# Patient Record
Sex: MALE | Race: BLACK OR AFRICAN AMERICAN | NOT HISPANIC OR LATINO | Employment: OTHER | URBAN - METROPOLITAN AREA
[De-identification: names, ages, dates, MRNs, and addresses within clinical notes are randomized per-mention and may not be internally consistent; named-entity substitution may affect disease eponyms.]

---

## 2017-01-13 ENCOUNTER — AMBULATORY - HEALTHEAST (OUTPATIENT)
Dept: FAMILY MEDICINE | Facility: CLINIC | Age: 57
End: 2017-01-13

## 2019-11-18 ENCOUNTER — OFFICE VISIT (OUTPATIENT)
Dept: FAMILY MEDICINE | Facility: CLINIC | Age: 59
End: 2019-11-18
Payer: COMMERCIAL

## 2019-11-18 VITALS
HEART RATE: 68 BPM | DIASTOLIC BLOOD PRESSURE: 68 MMHG | BODY MASS INDEX: 18.96 KG/M2 | HEIGHT: 72 IN | RESPIRATION RATE: 16 BRPM | WEIGHT: 140 LBS | TEMPERATURE: 97.9 F | SYSTOLIC BLOOD PRESSURE: 130 MMHG | OXYGEN SATURATION: 99 %

## 2019-11-18 DIAGNOSIS — J01.90 ACUTE SINUSITIS WITH SYMPTOMS > 10 DAYS: Primary | ICD-10-CM

## 2019-11-18 DIAGNOSIS — R05.9 COUGH: ICD-10-CM

## 2019-11-18 PROCEDURE — 99203 OFFICE O/P NEW LOW 30 MIN: CPT | Performed by: FAMILY MEDICINE

## 2019-11-18 RX ORDER — CODEINE PHOSPHATE AND GUAIFENESIN 10; 100 MG/5ML; MG/5ML
1-2 SOLUTION ORAL
Qty: 118 ML | Refills: 0 | Status: SHIPPED | OUTPATIENT
Start: 2019-11-18

## 2019-11-18 ASSESSMENT — MIFFLIN-ST. JEOR: SCORE: 1502.01

## 2019-11-18 NOTE — PROGRESS NOTES
"Subjective     Jorge L Brower is a 59 year old male who presents to clinic today for the following health issues:    HPI   RESPIRATORY SYMPTOMS      Duration: 2 weeks    Description  nasal congestion, rhinorrhea, sore throat, facial pain/pressure, cough and intermittent headache    Severity: severe    Accompanying signs and symptoms: None    History (predisposing factors):  Very long time ago used to use tobacco. Quit 5 years ago.     Precipitating or alleviating factors: None    Therapies tried and outcome:  rest and fluids acetaminophen OTC NSAID and benadryl; only give temporary minor relief    2 weeks ago with sore throat, congestion, runny nose. Tried Flonase without relief. Has used acetaminophen (doses unknown) and ibuprofen on occasion which has provided some relief. Feels that cough is worsening the last couple days. Endorses \"cough in the throat\" non productive. Cough is continuous once starts to cough. Denies HA, changes in vision, n/v, diarrhea, constipation, dysuria. He has not had the flu shot this year. He works at a  but is not exposed to kids at the .    Of note, he previously was seen at [facility] for medical health but has not been seen for there many years.      There is no problem list on file for this patient.    Past Surgical History:   Procedure Laterality Date     CHOLECYSTECTOMY  2016       Social History     Tobacco Use     Smoking status: Former Smoker     Smokeless tobacco: Never Used     Tobacco comment: quit several years ago, noted 11/2019   Substance Use Topics     Alcohol use: Not on file     Family History   Problem Relation Age of Onset     No Known Problems Mother      No Known Problems Father      No Known Problems Maternal Grandmother      No Known Problems Maternal Grandfather      No Known Problems Paternal Grandmother      No Known Problems Paternal Grandfather          Current Outpatient Medications   Medication Sig Dispense Refill     " "amoxicillin-clavulanate (AUGMENTIN) 875-125 MG tablet Take 1 tablet by mouth 2 times daily for 10 days 20 tablet 0     guaiFENesin-codeine (ROBITUSSIN AC) 100-10 MG/5ML solution Take 5-10 mLs by mouth nightly as needed for cough 118 mL 0     Allergies   Allergen Reactions     Cats      Dogs      Seasonal Allergies        Reviewed and updated as needed this visit by Provider    Review of Systems   ROS COMP: Constitutional, HEENT, cardiovascular, pulmonary, GI, , musculoskeletal, neuro, skin, endocrine and psych systems are negative, except as otherwise noted.      Objective    /68 (BP Location: Right arm, Patient Position: Chair, Cuff Size: Adult Regular)   Pulse 68   Temp 97.9  F (36.6  C) (Oral)   Resp 16   Ht 1.835 m (6' 0.25\")   Wt 63.5 kg (140 lb)   SpO2 99%   BMI 18.86 kg/m    Body mass index is 18.86 kg/m .     Physical Exam   GENERAL: healthy, alert and no distress  HENT: ear canals and TM's normal, nose and mouth without ulcers or lesions  HENT: normal cephalic/atraumatic, ear canals and TM's normal, nose and mouth without ulcers or lesions, oral mucous membranes moist and sinuses: maxillary, frontal tenderness on both sides with percussion - winces  NECK: no adenopathy, no asymmetry, masses, or scars and thyroid normal to palpation  RESP: lungs clear to auscultation - no rales, rhonchi or wheezes  CV: regular rate and rhythm, normal S1 S2, no S3 or S4, no murmur, click or rub, no peripheral edema and peripheral pulses strong    ASSESSMENT AND PLAN  1. Acute sinusitis with symptoms > 10 days  Start augmentin today (11/18). Use oxymetazolone nasal spray two sprays 2-3 times daily for three days for nasal congestion then stop.   - amoxicillin-clavulanate (AUGMENTIN) 875-125 MG tablet; Take 1 tablet by mouth 2 times daily for 10 days  Dispense: 20 tablet; Refill: 0    2. Cough  Can use cough syrup with codeine at night but do not drive after using.   - guaiFENesin-codeine (ROBITUSSIN AC) 100-10 " "MG/5ML solution; Take 5-10 mLs by mouth nightly as needed for cough  Dispense: 118 mL; Refill: 0    3. Health care maintenance  Pt is new to clinic and is due for annual physical exam + health maintenance review. Pt will contact clinic to schedule. Offered influenza vaccine today, patient declined.  - schedule physical and establish care      MYCHART FOR ON-LINE CARE(VISITS), LABS, REFILLS, MESSAGING, ETC [email or web address] [phone]    E-VISIT: click \"on-line care, then request e-visit\".  E-visits work well for following up on issues we have discussed in clinic previously which may need new prescriptions, new prescriptions or substantial discussion. These are always done by me (Dr. Wegener).     ONCARE VISIT:  [email or web address]  - we treat nearly 50 common conditions through on-care.  These are done in an hour by on-call staff.     RADIOLOGY:  [facility]:  [phone]   [facility]: [phone]    Mammogram and Colonoscopy [phone]    Smoking Cessation: www.quitplan.org, 0-434-583-PLAN (3171)      CONSUMER PRICE LINE for estimates of test costs:  [phone]     [place], MS3    I, Dr. Joel Wegener, MD, was present with the medical student who participated in the service and in the documentation of the note.  I have verified the history and personally performed the physical exam and medical decision making.  I agree with the assessment and plan of care as documented in the note.      Joel Wegener,MD    "

## 2019-11-18 NOTE — PATIENT INSTRUCTIONS
"ASSESSMENT AND PLAN  1. Acute sinusitis with symptoms > 10 days  Start augmentin  Now.     Use oxymetazolone nasal spray two sprays 2-3 times daily for two days for nasal congestion then stop.       - amoxicillin-clavulanate (AUGMENTIN) 875-125 MG tablet; Take 1 tablet by mouth 2 times daily for 10 days  Dispense: 20 tablet; Refill: 0    2. Cough  Can use cough syrup with codeine at night but do not drive after using.   - guaiFENesin-codeine (ROBITUSSIN AC) 100-10 MG/5ML solution; Take 5-10 mLs by mouth nightly as needed for cough  Dispense: 118 mL; Refill: 0        MYCHART FOR ON-LINE CARE(VISITS), LABS, REFILLS, MESSAGING, ETC [email or web address] [phone]    E-VISIT: click \"on-line care, then request e-visit\".  E-visits work well for following up on issues we have discussed in clinic previously which may need new prescriptions, new prescriptions or substantial discussion. These are always done by me (Dr. Wegener).     ONCARE VISIT:  [email or web address]  - we treat nearly 50 common conditions through on-care.  These are done in an hour by on-call staff.     RADIOLOGY:  [facility]:  [phone]   [facility]: [phone]    Mammogram and Colonoscopy [phone]    Smoking Cessation: www.quitplan.org, 3-538-478-PLAN (5158)      CONSUMER PRICE LINE for estimates of test costs:  [phone]       "

## 2019-11-29 ENCOUNTER — HOSPITAL ENCOUNTER (EMERGENCY)
Facility: CLINIC | Age: 59
Discharge: HOME | End: 2019-11-29
Admitting: PHYSICIAN ASSISTANT
Payer: COMMERCIAL

## 2019-11-29 ENCOUNTER — APPOINTMENT (OUTPATIENT)
Dept: GENERAL RADIOLOGY | Facility: CLINIC | Age: 59
End: 2019-11-29
Attending: PHYSICIAN ASSISTANT
Payer: COMMERCIAL

## 2019-11-29 VITALS
HEIGHT: 73 IN | BODY MASS INDEX: 19.22 KG/M2 | RESPIRATION RATE: 20 BRPM | OXYGEN SATURATION: 100 % | WEIGHT: 145 LBS | HEART RATE: 70 BPM | DIASTOLIC BLOOD PRESSURE: 99 MMHG | TEMPERATURE: 98.2 F | SYSTOLIC BLOOD PRESSURE: 113 MMHG

## 2019-11-29 DIAGNOSIS — R91.8 PULMONARY NODULES: ICD-10-CM

## 2019-11-29 DIAGNOSIS — J20.9 ACUTE BRONCHITIS: ICD-10-CM

## 2019-11-29 PROCEDURE — 71046 X-RAY EXAM CHEST 2 VIEWS: CPT

## 2019-11-29 PROCEDURE — 99283 EMERGENCY DEPT VISIT LOW MDM: CPT

## 2019-11-29 RX ORDER — BENZONATATE 200 MG/1
200 CAPSULE ORAL 3 TIMES DAILY PRN
Qty: 15 CAPSULE | Refills: 0 | Status: SHIPPED | OUTPATIENT
Start: 2019-11-29 | End: 2020-02-17

## 2019-11-29 RX ORDER — ALBUTEROL SULFATE 90 UG/1
2 AEROSOL, METERED RESPIRATORY (INHALATION) EVERY 6 HOURS PRN
Qty: 1 INHALER | Refills: 0 | Status: SHIPPED | OUTPATIENT
Start: 2019-11-29

## 2019-11-29 RX ORDER — PREDNISONE 20 MG/1
40 TABLET ORAL DAILY
Qty: 8 TABLET | Refills: 0 | Status: SHIPPED | OUTPATIENT
Start: 2019-11-29 | End: 2020-02-17

## 2019-11-29 ASSESSMENT — ENCOUNTER SYMPTOMS
CHILLS: 0
COUGH: 1
SHORTNESS OF BREATH: 0
FEVER: 0
RHINORRHEA: 1

## 2019-11-29 ASSESSMENT — MIFFLIN-ST. JEOR: SCORE: 1536.6

## 2019-11-29 NOTE — ED TRIAGE NOTES
Patient presents with cough for 10 days was seen at walk in clinic was prescribed amoxicillin 10 days ago and finished. Denies fevers. ABC's intact.

## 2019-11-29 NOTE — ED AVS SNAPSHOT
[facility] Emergency Department  [address]  [facility] [address]  Phone:  [phone]  Fax:  [phone]                                    Jorge L Brower   MRN: [id number]    Department:  [facility] Emergency Department   Date of Visit:  11/29/2019           After Visit Summary Signature Page    I have received my discharge instructions, and my questions have been answered. I have discussed any challenges I see with this plan with the nurse or doctor.    ..........................................................................................................................................  Patient/Patient Representative Signature      ..........................................................................................................................................  Patient Representative Print Name and Relationship to Patient    ..................................................               ................................................  Date                                   Time    ..........................................................................................................................................  Reviewed by Signature/Title    ...................................................              ..............................................  Date                                               Time          22EPIC Rev 08/18

## 2019-11-30 NOTE — DISCHARGE INSTRUCTIONS
Discharge Instructions  Bronchitis, Pneumonia, Bronchospasm    You were seen today for a chest infection or inflammation. If your provider decided this was due to a bacterial infection, you may need an antibiotic. Sometimes these are caused by a virus, and then an antibiotic will not help.     Generally, every Emergency Department visit should have a follow-up clinic visit with either a primary or a specialty clinic/provider. Please follow-up as instructed by your emergency provider today.    Return to the Emergency Department if:  Your breathing gets much worse.  You are very weak, or feel much more ill.  You develop new symptoms, such as chest pain.  You cough up blood.  You are vomiting (throwing up) enough that you cannot keep fluids or your medicine down.    What can I do to help myself?  Fill any prescriptions the provider gave you and take them right away--especially antibiotics. Be sure to finish the whole antibiotic prescription.  You may be given a prescription for an inhaler, which can help loosen tight air passages.  Use this as needed, but not more often than directed. Inhalers work much better when used with a spacer.   You may be given a prescription for a steroid to reduce inflammation. Used long-term, these can have side effects, but for short-term use they are safe. You may notice restlessness or increased appetite.      You may use non-prescription cough or cold medicines. Cough medicines may help, but don t make the cough go away completely.   Avoid smoke, because this can make your symptoms worse. If you smoke, this may be a good time to quit! Consider using nicotine lozenges, gum, or patches to reduce cravings.   If you have a fever, Tylenol  (acetaminophen), Motrin  (ibuprofen), or Advil  (ibuprofen) may help bring fever down and may help you feel more comfortable. Be sure to read and follow the package directions, and ask your provider if you have questions.  Be sure to get your flu shot each  year.  For certain ages, the pneumonia shot can help prevent pneumonia.  If you were given a prescription for medicine here today, be sure to read all of the information (including the package insert) that comes with your prescription.  This will include important information about the medicine, its side effects, and any warnings that you need to know about.  The pharmacist who fills the prescription can provide more information and answer questions you may have about the medicine.  If you have questions or concerns that the pharmacist cannot address, please call or return to the Emergency Department.   Discharge Instructions  Incidental Findings    An incidental finding is something unexpected that was found while you were being treated and is felt to not be related to the reason that you came to the Emergency Department.  While this finding is not an emergency, you need to follow up with your primary provider (or occasionally a specialist) to determine if anything should be done about it.    These findings can come from:  Checking your vital signs (example: high blood pressure).  Taking your history (example: unexplained weight loss).  The physical exam (example: a heart murmur).  Laboratory study (example: anemia or low blood count).  X-rays/ultrasound/CT or other imaging (example: an unexplained mass).    Generally, every Emergency Department visit should have a follow-up clinic visit with either a primary or a specialty clinic/provider. Please follow-up as instructed by your emergency provider today.    Return to the Emergency Department if:  Your condition worsens.  You develop unexpected pain.  You now develop new symptoms or have new concerns.  If you were given a prescription for medicine here today, be sure to read all of the information (including the package insert) that comes with your prescription.  This will include important information about the medicine, its side effects, and any warnings that you  need to know about.  The pharmacist who fills the prescription can provide more information and answer questions you may have about the medicine.  If you have questions or concerns that the pharmacist cannot address, please call or return to the Emergency Department.   Remember that you can always come back to the Emergency Department if you are not able to see your regular provider in the amount of time listed above, if you get any new symptoms, or if there is anything that worries you.      Remember that you can always come back to the Emergency Department if you are not able to see your regular provider in the amount of time listed above, if you get any new symptoms, or if there is anything that worries you.

## 2019-11-30 NOTE — ED PROVIDER NOTES
History     Chief Complaint:  Cough      HPI   Jorge L Brower is a 59 year old male who presents with cough for the past 10 days.  The patient reports that his symptoms began with rhinorrhea, and postnasal drainage before this resolved and the move down into his chest.  He was seen in urgent care and put on amoxicillin which he finished yesterday with no improvement.  No chest x-ray was performed at that time.  No history of asthma, COPD, and patient denies tobacco use.  Denies chest pain or leg swelling.  No recent travel outside the region.    Allergies:  Allergies   Allergen Reactions     Cats      Dogs      Seasonal Allergies         Medications:    albuterol (PROAIR HFA/PROVENTIL HFA/VENTOLIN HFA) 108 (90 Base) MCG/ACT inhaler  benzonatate (TESSALON) 200 MG capsule  predniSONE (DELTASONE) 20 MG tablet  guaiFENesin-codeine (ROBITUSSIN AC) 100-10 MG/5ML solution        Problem List:   There are no active problems to display for this patient.       Past Medical History:   No past medical history on file.    Past Surgical History:   Past Surgical History:   Procedure Laterality Date     CHOLECYSTECTOMY  2016       Family History:   Family History   Problem Relation Age of Onset     No Known Problems Mother      No Known Problems Father      No Known Problems Maternal Grandmother      No Known Problems Maternal Grandfather      No Known Problems Paternal Grandmother      No Known Problems Paternal Grandfather        Social History  Marital Status:   [2]  Social History     Tobacco Use     Smoking status: Former Smoker     Smokeless tobacco: Never Used     Tobacco comment: quit several years ago, noted 11/2019   Substance Use Topics     Alcohol use: Not on file     Drug use: Not on file        Review of Systems   Constitutional: Negative for chills and fever.   HENT: Positive for rhinorrhea.    Respiratory: Positive for cough. Negative for shortness of breath.    All other systems reviewed and are  "negative.    Physical Exam   First Vitals:  BP: (!) 113/99  Pulse: 70  Temp: 98.2  F (36.8  C)  Resp: 20  Height: 185.4 cm (6' 1\")  Weight: 65.8 kg (145 lb)  SpO2: 100 %      Physical Exam  General: Alert and cooperative with exam. Resting comfortably on gurney  Head:  Scalp is NC/AT  Eyes:  No scleral icterus, PERRL  ENT:  The external nose and ears are normal.     The oropharynx is normal and without erythema or tonsillar swelling. Uvula midline, no submandibular swelling, sublingual swelling, trismus  Neck:  Normal range of motion without rigidity.  CV:  Regular rate and rhythm    No pathologic murmur, rubs, or gallops.  Resp:  There is end expiratory wheezing bilaterally.  No crackles or rhonchi.    Non-labored, no retractions or accessory muscle use  GI:  Abdomen is soft, no distension, no tenderness, no masses. No peritoneal signs.  Bowel sounds present in all quadrants  MS:  No lower extremity edema or asymmetric calf swelling.  Skin:  Warm and dry, No rash or lesions noted.  Neuro: Oriented. No gross motor deficits.  Psych: Awake. Alert. Normal affect. Appropriate interactions.      Emergency Department Course   Imaging:  Radiographic findings were communicated with the patient who voiced understanding of the findings.  Chest XR,  PA & LAT   Final Result   IMPRESSION: Calcified 3 cm nodule at the right apex likely from prior granulomatous process. Lungs otherwise clear. No pleural effusion or pneumothorax. Normal heart size and mediastinal contour.        Interventions:  None    Emergency Department Course:  I performed an exam of the patient as above.  The patient was sent for labs/imaging as above.  Re-checked the patient.  Findings and plan discussed with the patient and family.  Patient was discharged to home with return precautions.    Impression & Plan    Medical Decision Makin-year-old male who presents with cough.  Patient history and records reviewed.  On examination, the patient is " well-appearing and afebrile with normal vitals.  X-ray shows no evidence of pneumonia, pneumothorax, effusion, or cardiomegaly.  It does demonstrate a small calcified 3 cm pulmonary nodule.  He is not hypoxic and is otherwise well appearing.  No evidence of more serious cardiac or pulmonary etiology at this time.  Given bronchospasm will prescribe symptomatic treatments as below.  Discussed the viral nature of bronchitis, and he is already completed a course of amoxicillin and explained that I do not feel additional antibiotics are indicated at this time given the duration, clear chest x-ray.  Discussed that bronchitis can often last up to 15 days.      We discussed the patient's finding of pulmonary nodule , and he understands the importance of follow-up with PCP to exclude more concerning underlying process such as malignancy.  Explained that repeat imaging may be indicated.  Return precautions were given for any new or worsening symptoms including fevers greater than 102, increased shortness of breath, chest pain etc.  Diagnosis:    ICD-10-CM    1. Pulmonary nodules R91.8    2. Acute bronchitis J20.9        Disposition:  discharged to home    Discharge Medications:  New Prescriptions    ALBUTEROL (PROAIR HFA/PROVENTIL HFA/VENTOLIN HFA) 108 (90 BASE) MCG/ACT INHALER    Inhale 2 puffs into the lungs every 6 hours as needed for shortness of breath / dyspnea or wheezing    BENZONATATE (TESSALON) 200 MG CAPSULE    Take 1 capsule (200 mg) by mouth 3 times daily as needed for cough    PREDNISONE (DELTASONE) 20 MG TABLET    Take 2 tablets (40 mg) by mouth daily for 4 days       Keaton Hinton PA-C  11/29/2019   [facility] EMERGENCY DEPARTMENT       Keaton Hinton PA-C  11/29/19 1943

## 2020-01-13 ENCOUNTER — COMMUNICATION - HEALTHEAST (OUTPATIENT)
Dept: FAMILY MEDICINE | Facility: CLINIC | Age: 60
End: 2020-01-13

## 2020-01-16 ENCOUNTER — OFFICE VISIT - HEALTHEAST (OUTPATIENT)
Dept: FAMILY MEDICINE | Facility: CLINIC | Age: 60
End: 2020-01-16

## 2020-01-16 DIAGNOSIS — Z01.89 ENCOUNTER FOR URINE TEST: ICD-10-CM

## 2020-01-16 DIAGNOSIS — Z12.11 SCREEN FOR COLON CANCER: ICD-10-CM

## 2020-01-18 LAB
6MAM UR QL: NOT DETECTED
7AMINOCLONAZEPAM UR QL: NOT DETECTED
A-OH ALPRAZ UR QL: NOT DETECTED
ALPRAZ UR QL: NOT DETECTED
AMPHET UR QL SCN: NOT DETECTED
BARBITURATES UR QL: NOT DETECTED
BUPRENORPHINE UR QL: NOT DETECTED
BZE UR QL: NOT DETECTED
CARBOXYTHC UR QL: NOT DETECTED
CARISOPRODOL UR QL: NOT DETECTED
CLONAZEPAM UR QL: NOT DETECTED
CODEINE UR QL: NOT DETECTED
CREAT UR-MCNC: 236.1 MG/DL (ref 20–400)
DIAZEPAM UR QL: NOT DETECTED
ETHYL GLUCURONIDE UR QL: PRESENT
FENTANYL UR QL: NOT DETECTED
HYDROCODONE UR QL: NOT DETECTED
HYDROMORPHONE UR QL: NOT DETECTED
LORAZEPAM UR QL: NOT DETECTED
MDA UR QL: NOT DETECTED
MDEA UR QL: NOT DETECTED
MDMA UR QL: NOT DETECTED
ME-PHENIDATE UR QL: NOT DETECTED
MEPERIDINE UR QL: NOT DETECTED
METHADONE UR QL: NOT DETECTED
METHAMPHET UR QL: NOT DETECTED
MIDAZOLAM UR QL SCN: NOT DETECTED
MORPHINE UR QL: NOT DETECTED
NORBUPRENORPHINE UR QL CFM: NOT DETECTED
NORDIAZEPAM UR QL: NOT DETECTED
NORFENTANYL UR QL: NOT DETECTED
NORHYDROCODONE UR QL CFM: NOT DETECTED
NOROXYCODONE UR QL CFM: NOT DETECTED
NOROXYMORPHONE UR QL SCN: NOT DETECTED
OXAZEPAM UR QL: NOT DETECTED
OXYCODONE UR QL: NOT DETECTED
OXYMORPHONE UR QL: NOT DETECTED
PATHOLOGY STUDY: NORMAL
PCP UR QL: NOT DETECTED
PHENTERMINE UR QL: NOT DETECTED
PROPOXYPH UR QL: NOT DETECTED
SERVICE CMNT-IMP: NORMAL
TAPENTADOL UR QL SCN: NOT DETECTED
TAPENTADOL UR QL SCN: NOT DETECTED
TEMAZEPAM UR QL: NOT DETECTED
TRAMADOL UR QL: NOT DETECTED
ZOLPIDEM UR QL: NOT DETECTED

## 2020-01-20 ENCOUNTER — COMMUNICATION - HEALTHEAST (OUTPATIENT)
Dept: FAMILY MEDICINE | Facility: CLINIC | Age: 60
End: 2020-01-20

## 2020-02-17 ENCOUNTER — OFFICE VISIT (OUTPATIENT)
Dept: FAMILY MEDICINE | Facility: CLINIC | Age: 60
End: 2020-02-17
Payer: COMMERCIAL

## 2020-02-17 VITALS
BODY MASS INDEX: 19.28 KG/M2 | HEART RATE: 71 BPM | TEMPERATURE: 97.9 F | DIASTOLIC BLOOD PRESSURE: 78 MMHG | OXYGEN SATURATION: 98 % | SYSTOLIC BLOOD PRESSURE: 112 MMHG | WEIGHT: 146.1 LBS | RESPIRATION RATE: 18 BRPM

## 2020-02-17 DIAGNOSIS — J20.9 ACUTE BRONCHITIS, UNSPECIFIED ORGANISM: Primary | ICD-10-CM

## 2020-02-17 DIAGNOSIS — Z00.00 HEALTH CARE MAINTENANCE: ICD-10-CM

## 2020-02-17 PROCEDURE — 99214 OFFICE O/P EST MOD 30 MIN: CPT | Performed by: PHYSICIAN ASSISTANT

## 2020-02-17 RX ORDER — ALBUTEROL SULFATE 90 UG/1
1-2 AEROSOL, METERED RESPIRATORY (INHALATION) EVERY 6 HOURS PRN
Qty: 1 INHALER | Refills: 0 | Status: SHIPPED | OUTPATIENT
Start: 2020-02-17

## 2020-02-17 RX ORDER — PREDNISONE 20 MG/1
40 TABLET ORAL DAILY
Qty: 10 TABLET | Refills: 0 | Status: SHIPPED | OUTPATIENT
Start: 2020-02-17 | End: 2020-02-22

## 2020-02-17 RX ORDER — BENZONATATE 100 MG/1
100 CAPSULE ORAL 3 TIMES DAILY PRN
Qty: 30 CAPSULE | Refills: 0 | Status: SHIPPED | OUTPATIENT
Start: 2020-02-17

## 2020-02-17 NOTE — PROGRESS NOTES
Subjective     Jorge L Brower is a 59 year old male who presents to clinic today for the following health issues:    HPI   RESPIRATORY SYMPTOMS      Duration: 2-3 weeks    Description  nasal congestion and cough    Severity: severe    Accompanying signs and symptoms: scratchy throat    History (predisposing factors):  none    Precipitating or alleviating factors: None    Therapies tried and outcome:  OTC NSAID, Robitussin     2-3 weeks of persistent cough, sometimes productive of clear or white mucus. Some shortness of breath and wheezing with deep coughing. Cough is keeping him up at night. No fever. Also dry, scratchy throat, nasal congestion, some fatigue. Occasional sinus pressure. Tried robitussin, delsym without significant relief. No history of asthma, COPD. Former smoker - quit 5 years ago. No chest pain. No recent travel. No abdominal pain, nausea, vomiting, diarrhea.    There is no problem list on file for this patient.    Past Surgical History:   Procedure Laterality Date     CHOLECYSTECTOMY  2016       Social History     Tobacco Use     Smoking status: Former Smoker     Smokeless tobacco: Never Used     Tobacco comment: quit several years ago, noted 11/2019   Substance Use Topics     Alcohol use: Not on file     Family History   Problem Relation Age of Onset     No Known Problems Mother      No Known Problems Father      No Known Problems Maternal Grandmother      No Known Problems Maternal Grandfather      No Known Problems Paternal Grandmother      No Known Problems Paternal Grandfather          Current Outpatient Medications   Medication Sig Dispense Refill     albuterol (PROAIR HFA/PROVENTIL HFA/VENTOLIN HFA) 108 (90 Base) MCG/ACT inhaler Inhale 1-2 puffs into the lungs every 6 hours as needed for shortness of breath / dyspnea or wheezing 1 Inhaler 0     albuterol (PROAIR HFA/PROVENTIL HFA/VENTOLIN HFA) 108 (90 Base) MCG/ACT inhaler Inhale 2 puffs into the lungs every 6 hours as needed for  shortness of breath / dyspnea or wheezing 1 Inhaler 0     benzonatate (TESSALON) 100 MG capsule Take 1 capsule (100 mg) by mouth 3 times daily as needed for cough 30 capsule 0     guaiFENesin-codeine (ROBITUSSIN AC) 100-10 MG/5ML solution Take 5-10 mLs by mouth nightly as needed for cough 118 mL 0     predniSONE (DELTASONE) 20 MG tablet Take 2 tablets (40 mg) by mouth daily for 5 days 10 tablet 0     Allergies   Allergen Reactions     Cats      Dogs      Seasonal Allergies        Reviewed and updated as needed this visit by Provider  Allergies  Med Hx         Review of Systems   ROS COMP: Constitutional, HEENT, cardiovascular, pulmonary, gi and gu systems are negative, except as otherwise noted.      Objective    /78 (BP Location: Right arm, Patient Position: Chair, Cuff Size: Adult Regular)   Pulse 71   Temp 97.9  F (36.6  C) (Oral)   Resp 18   Wt 66.3 kg (146 lb 1.6 oz)   SpO2 98%   BMI 19.28 kg/m    Body mass index is 19.28 kg/m .  Physical Exam   GENERAL: healthy, alert and no distress  EYES: Eyes grossly normal to inspection, PERRL and conjunctivae and sclerae normal  HENT: ear canals and TM's normal, nose and mouth without ulcers or lesions  NECK: no adenopathy, no asymmetry, masses  RESP: harsh cough throughout exam, lungs clear to auscultation - no rales, rhonchi or wheezes  CV: regular rate and rhythm, normal S1 S2, no S3 or S4, no murmur, click or rub  MS: no gross musculoskeletal defects noted, no edema    Diagnostic Test Results: None         Assessment & Plan     1. Acute bronchitis, unspecified organism  - predniSONE (DELTASONE) 20 MG tablet; Take 2 tablets (40 mg) by mouth daily for 5 days  Dispense: 10 tablet; Refill: 0  - albuterol (PROAIR HFA/PROVENTIL HFA/VENTOLIN HFA) 108 (90 Base) MCG/ACT inhaler; Inhale 1-2 puffs into the lungs every 6 hours as needed for shortness of breath / dyspnea or wheezing  Dispense: 1 Inhaler; Refill: 0  - benzonatate (TESSALON) 100 MG capsule; Take 1  capsule (100 mg) by mouth 3 times daily as needed for cough  Dispense: 30 capsule; Refill: 0    Appears well, non-toxic, oxygen 98%, afebrile. He does have a persistent harsh cough throughout exam but lungs are clear. Cough lasting 2-3 weeks still within normal timeframe for viral bronchitis. No history of asthma or COPD, former smoker (quit 5 years ago). Discussed symptoms likely viral. Due to intense persistent cough and reports of wheezing, will do short course of prednisone, Albuterol inhaler if needed, Tessalon for cough suppression. Recommend rest, fluids. Follow-up if worsening cough, fever, shortness of breath, or other concerns. Risks and benefits of treatment plan discussed. Patient and/or parent acknowledges and agrees with plan of care, all questions answered.    2. Health care maintenance  Patient is new to clinic and is due for annual physical exam with health maintenance review. Patient understands and will schedule physical to establish care.      Return in about 1 month (around 3/17/2020) for Preventive Physical Exam.  If worsening or no improvement    Christy Mills PA-C  [facility]

## 2020-02-17 NOTE — PATIENT INSTRUCTIONS
Discussed symptoms likely viral. Due to intense persistent cough, will do short course of prednisone, Albuterol inhaler if needed, Tessalon for cough suppression. Recommend rest, fluids. Follow-up if worsening cough, fever, shortness of breath, or other concerns.

## 2021-03-18 ENCOUNTER — NURSE TRIAGE (OUTPATIENT)
Dept: FAMILY MEDICINE | Facility: CLINIC | Age: 61
End: 2021-03-18

## 2021-03-18 NOTE — TELEPHONE ENCOUNTER
Patient reports pain at 8/10 this morning.  It has let up a bit, but is still very painful at 6/10.  He will go to the ED for further evaluation.    Reason for Disposition    SEVERE abdominal pain (e.g., excruciating)    Additional Information    Negative: Passed out (i.e., fainted, collapsed and was not responding)    Negative: Shock suspected (e.g., cold/pale/clammy skin, too weak to stand, low BP, rapid pulse)    Negative: Sounds like a life-threatening emergency to the triager    Negative: Chest pain    Negative: Pain is mainly in upper abdomen (if needed ask: 'is it mainly above the belly button?')    Negative: Vomiting red blood or black (coffee ground) material    Negative: Bloody, black, or tarry bowel movements    Negative: Unable to urinate (or only a few drops) and bladder feels very full    Negative: Pain in scrotum persists > 1 hour    Constant abdominal pain lasting > 2 hours    Protocols used: ABDOMINAL PAIN - MALE-A-OH

## 2021-03-20 ENCOUNTER — APPOINTMENT (OUTPATIENT)
Dept: CT IMAGING | Facility: CLINIC | Age: 61
End: 2021-03-20
Attending: EMERGENCY MEDICINE
Payer: COMMERCIAL

## 2021-03-20 ENCOUNTER — HOSPITAL ENCOUNTER (EMERGENCY)
Facility: CLINIC | Age: 61
Discharge: HOME | End: 2021-03-21
Attending: EMERGENCY MEDICINE | Admitting: EMERGENCY MEDICINE
Payer: COMMERCIAL

## 2021-03-20 DIAGNOSIS — N20.1 URETERAL STONE: ICD-10-CM

## 2021-03-20 LAB
ALBUMIN SERPL-MCNC: 3.8 G/DL (ref 3.4–5)
ALBUMIN UR-MCNC: 20 MG/DL
ALP SERPL-CCNC: 62 U/L (ref 40–150)
ALT SERPL W P-5'-P-CCNC: 18 U/L (ref 0–70)
ANION GAP SERPL CALCULATED.3IONS-SCNC: 4 MMOL/L (ref 3–14)
APPEARANCE UR: CLEAR
AST SERPL W P-5'-P-CCNC: 18 U/L (ref 0–45)
BASOPHILS # BLD AUTO: 0.1 10E9/L (ref 0–0.2)
BASOPHILS NFR BLD AUTO: 0.5 %
BILIRUB SERPL-MCNC: 0.5 MG/DL (ref 0.2–1.3)
BILIRUB UR QL STRIP: NEGATIVE
BUN SERPL-MCNC: 19 MG/DL (ref 7–30)
CALCIUM SERPL-MCNC: 8.9 MG/DL (ref 8.5–10.1)
CHLORIDE SERPL-SCNC: 104 MMOL/L (ref 94–109)
CO2 SERPL-SCNC: 30 MMOL/L (ref 20–32)
COLOR UR AUTO: YELLOW
CREAT SERPL-MCNC: 1.43 MG/DL (ref 0.66–1.25)
DIFFERENTIAL METHOD BLD: NORMAL
EOSINOPHIL # BLD AUTO: 0.3 10E9/L (ref 0–0.7)
EOSINOPHIL NFR BLD AUTO: 3.1 %
ERYTHROCYTE [DISTWIDTH] IN BLOOD BY AUTOMATED COUNT: 13.1 % (ref 10–15)
GFR SERPL CREATININE-BSD FRML MDRD: 53 ML/MIN/{1.73_M2}
GLUCOSE SERPL-MCNC: 94 MG/DL (ref 70–99)
GLUCOSE UR STRIP-MCNC: NEGATIVE MG/DL
HCT VFR BLD AUTO: 45.7 % (ref 40–53)
HGB BLD-MCNC: 14.7 G/DL (ref 13.3–17.7)
HGB UR QL STRIP: ABNORMAL
IMM GRANULOCYTES # BLD: 0 10E9/L (ref 0–0.4)
IMM GRANULOCYTES NFR BLD: 0.3 %
KETONES UR STRIP-MCNC: ABNORMAL MG/DL
LEUKOCYTE ESTERASE UR QL STRIP: NEGATIVE
LIPASE SERPL-CCNC: 120 U/L (ref 73–393)
LYMPHOCYTES # BLD AUTO: 2.9 10E9/L (ref 0.8–5.3)
LYMPHOCYTES NFR BLD AUTO: 26.5 %
MCH RBC QN AUTO: 30.6 PG (ref 26.5–33)
MCHC RBC AUTO-ENTMCNC: 32.2 G/DL (ref 31.5–36.5)
MCV RBC AUTO: 95 FL (ref 78–100)
MONOCYTES # BLD AUTO: 0.7 10E9/L (ref 0–1.3)
MONOCYTES NFR BLD AUTO: 6.4 %
MUCOUS THREADS #/AREA URNS LPF: PRESENT /LPF
NEUTROPHILS # BLD AUTO: 6.9 10E9/L (ref 1.6–8.3)
NEUTROPHILS NFR BLD AUTO: 63.2 %
NITRATE UR QL: NEGATIVE
NRBC # BLD AUTO: 0 10*3/UL
NRBC BLD AUTO-RTO: 0 /100
PH UR STRIP: 6 PH (ref 5–7)
PLATELET # BLD AUTO: 259 10E9/L (ref 150–450)
POTASSIUM SERPL-SCNC: 4.3 MMOL/L (ref 3.4–5.3)
PROT SERPL-MCNC: 7.3 G/DL (ref 6.8–8.8)
RBC # BLD AUTO: 4.8 10E12/L (ref 4.4–5.9)
RBC #/AREA URNS AUTO: 39 /HPF (ref 0–2)
SODIUM SERPL-SCNC: 138 MMOL/L (ref 133–144)
SOURCE: ABNORMAL
SP GR UR STRIP: 1.02 (ref 1–1.03)
UROBILINOGEN UR STRIP-MCNC: NORMAL MG/DL (ref 0–2)
WBC # BLD AUTO: 10.9 10E9/L (ref 4–11)
WBC #/AREA URNS AUTO: 8 /HPF (ref 0–5)

## 2021-03-20 PROCEDURE — 83690 ASSAY OF LIPASE: CPT | Performed by: EMERGENCY MEDICINE

## 2021-03-20 PROCEDURE — 81001 URINALYSIS AUTO W/SCOPE: CPT | Performed by: EMERGENCY MEDICINE

## 2021-03-20 PROCEDURE — 85025 COMPLETE CBC W/AUTO DIFF WBC: CPT | Performed by: EMERGENCY MEDICINE

## 2021-03-20 PROCEDURE — 74176 CT ABD & PELVIS W/O CONTRAST: CPT

## 2021-03-20 PROCEDURE — 96375 TX/PRO/DX INJ NEW DRUG ADDON: CPT

## 2021-03-20 PROCEDURE — 96374 THER/PROPH/DIAG INJ IV PUSH: CPT

## 2021-03-20 PROCEDURE — 250N000011 HC RX IP 250 OP 636: Performed by: EMERGENCY MEDICINE

## 2021-03-20 PROCEDURE — 80053 COMPREHEN METABOLIC PANEL: CPT | Performed by: EMERGENCY MEDICINE

## 2021-03-20 PROCEDURE — 93005 ELECTROCARDIOGRAM TRACING: CPT

## 2021-03-20 PROCEDURE — 99285 EMERGENCY DEPT VISIT HI MDM: CPT | Mod: 25

## 2021-03-20 RX ORDER — HYDROMORPHONE HYDROCHLORIDE 1 MG/ML
0.5 INJECTION, SOLUTION INTRAMUSCULAR; INTRAVENOUS; SUBCUTANEOUS ONCE
Status: DISCONTINUED | OUTPATIENT
Start: 2021-03-20 | End: 2021-03-21 | Stop reason: HOSPADM

## 2021-03-20 RX ORDER — ONDANSETRON 4 MG/1
4 TABLET, ORALLY DISINTEGRATING ORAL EVERY 6 HOURS PRN
Qty: 20 TABLET | Refills: 0 | Status: SHIPPED | OUTPATIENT
Start: 2021-03-20

## 2021-03-20 RX ORDER — OXYCODONE HYDROCHLORIDE 5 MG/1
5 TABLET ORAL EVERY 6 HOURS PRN
Qty: 12 TABLET | Refills: 0 | Status: SHIPPED | OUTPATIENT
Start: 2021-03-20

## 2021-03-20 RX ORDER — KETOROLAC TROMETHAMINE 15 MG/ML
15 INJECTION, SOLUTION INTRAMUSCULAR; INTRAVENOUS ONCE
Status: COMPLETED | OUTPATIENT
Start: 2021-03-20 | End: 2021-03-20

## 2021-03-20 RX ORDER — ONDANSETRON 2 MG/ML
4 INJECTION INTRAMUSCULAR; INTRAVENOUS EVERY 30 MIN PRN
Status: DISCONTINUED | OUTPATIENT
Start: 2021-03-20 | End: 2021-03-21 | Stop reason: HOSPADM

## 2021-03-20 RX ORDER — MORPHINE SULFATE 4 MG/ML
4 INJECTION, SOLUTION INTRAMUSCULAR; INTRAVENOUS
Status: DISCONTINUED | OUTPATIENT
Start: 2021-03-20 | End: 2021-03-21 | Stop reason: HOSPADM

## 2021-03-20 RX ORDER — SENNA AND DOCUSATE SODIUM 50; 8.6 MG/1; MG/1
1-2 TABLET, FILM COATED ORAL 2 TIMES DAILY PRN
Qty: 30 TABLET | Refills: 0 | Status: SHIPPED | OUTPATIENT
Start: 2021-03-20 | End: 2021-04-19

## 2021-03-20 RX ADMIN — KETOROLAC TROMETHAMINE 15 MG: 15 INJECTION, SOLUTION INTRAMUSCULAR; INTRAVENOUS at 23:26

## 2021-03-20 RX ADMIN — MORPHINE SULFATE 4 MG: 4 INJECTION INTRAVENOUS at 21:41

## 2021-03-20 RX ADMIN — ONDANSETRON 4 MG: 2 INJECTION INTRAMUSCULAR; INTRAVENOUS at 21:42

## 2021-03-20 ASSESSMENT — ENCOUNTER SYMPTOMS
FEVER: 0
ABDOMINAL PAIN: 1
SHORTNESS OF BREATH: 0
FREQUENCY: 0
COUGH: 0

## 2021-03-21 VITALS
SYSTOLIC BLOOD PRESSURE: 132 MMHG | BODY MASS INDEX: 19.79 KG/M2 | OXYGEN SATURATION: 100 % | HEART RATE: 76 BPM | WEIGHT: 150 LBS | TEMPERATURE: 97.1 F | DIASTOLIC BLOOD PRESSURE: 66 MMHG | RESPIRATION RATE: 16 BRPM

## 2021-03-21 NOTE — ED PROVIDER NOTES
History   Chief Complaint:  Abdominal Pain       HPI   Jorge L Brower is a 60 year old male who presents with abdominal pain. The patient developed severe nnonradiating left flank pain this morning w/o specific alleviating or worsening factors. He denies having abdominal pain like this before. The abdominal pain does not radiate anywhere and has stayed constant. He has tried taking Tylenol at home without significant improvement. He denies any chest pain, shortness of breath, cough, fever, or urinary frequency. He is a former smoker and denies alcohol use.     Review of Systems   Constitutional: Negative for fever.   Respiratory: Negative for cough and shortness of breath.    Cardiovascular: Negative for chest pain.   Gastrointestinal: Positive for abdominal pain (LLQ).   Genitourinary: Negative for frequency.   All other systems reviewed and are negative.       Allergies:  Cats  Dogs  Seasonal Allergies    Medications:  The patient is not on any medications.    Past Medical History:    Gout     Past Surgical History:    Cholecystectomy     Social History:  The patient presents alone.   The patient is a former smoker and denies alcohol use.     Physical Exam     Patient Vitals for the past 24 hrs:   BP Temp Temp src Pulse Resp SpO2 Weight   03/20/21 1927 (!) 147/94 97.1  F (36.2  C) Temporal 73 16 99 % 68 kg (150 lb)       Physical Exam  Constitutional: Well developed, nontox appearance  Head: Atraumatic.   Neck:  no stridor  Eyes: no scleral icterus  Cardiovascular: RRR, 2+ bilat radial pulses  Pulmonary/Chest: nml resp effort  Abdominal: ND, soft, NT, no rebound or guarding   : L CVA tenderness  Ext: Warm, well perfused, no edema  Neurological: A&O, symmetric facies, moves ext x4  Skin: Skin is warm and dry.   Psychiatric: Behavior is normal. Thought content normal.   Nursing note and vitals reviewed.    Emergency Department Course   ECG:  ECG taken at 2233, ECG read at 2236  Normal sinus rhythm  Normal  ECG  Rate 76 bpm. IN interval 174 ms. QRS duration 96 ms. QT/QTc 360/405 ms. P-R-T axes 80 48 59.     Imaging:  CT Abdomen Pelvis w/o Contrast  Final Result  IMPRESSION:   1.  1 mm distal left ureteral stone with mild secondary hydronephrosis left renal and perirenal edema. Additional bilateral nonobstructive nephrolithiasis.  Reading per radiology     Laboratory:  UA with micro: Urineketone trace (A) Urine Blood moderate (A) Protein Albumin Urine 20 (A) WBC/HPF 8 (H) RBC/HPF 39 (H) Mucous urine present (A)  o/w wnl/negative        CBC:  WBC 10.9, HGB 14.7, , o/w WNL     CMP: GFR 53 (L), o/w WNL (Creatinine: 1.43 (H))     Lipase: 120      Emergency Department Course:    Reviewed:  I reviewed the patient's nursing notes, vitals, past medical records, Care Everywhere.     Assessments:    I performed an exam of the patient as documented above.    Patient rechecked and updated.     Interventions:   Morphine 4 mg IV   2142 zofran 4 mg IV   2326 Toradol 15 mg IV     Disposition:  Discharged to home.      Impression & Plan     Medical Decision Makin year old male presenting w/ L flank painn     Signs and symptoms consistent with ureterolithiasis which was confirmed on CT imaging.  Doubt colitis, cholecystitis, aneurysm, dissection, volvulus, appendicitis, splenic pathology, ulcer,  pneumonia, rib fracture, UTI, pyelonephritis.  Patients pain is controlled in ED and they were not given flomax given stone <5mm.  No signs of infected stone.  Patient is hemodynamically stable in ED. At this time I feel the patient is safe for discharge.  Recommendations given regarding follow up with PCP/Urology and return to the emergency department as needed for new or worsening symptoms.  Counseled on all results, diagnosis and disposition.  Pt understanding and agreeable to plan. Patient discharged in stable condition.        Diagnosis:    ICD-10-CM    1. Ureteral stone  N20.1        Discharge Medications:  New  Prescriptions    ONDANSETRON (ZOFRAN ODT) 4 MG ODT TAB    Take 1 tablet (4 mg) by mouth every 6 hours as needed for nausea or vomiting    OXYCODONE (ROXICODONE) 5 MG TABLET    Take 1 tablet (5 mg) by mouth every 6 hours as needed for pain    SENNA-DOCUSATE SODIUM (SENNA S) 8.6-50 MG TABLET    Take 1-2 tablets by mouth 2 times daily as needed (if taking oxycodone)       Scribe Disclosure:  [place], am serving as a scribe at 9:25 PM on 3/20/2021 to document services personally performed by Kingsley Mccullough MD based on my observations and the provider's statements to me.        Kingsley Mccullough MD  03/21/21 2452

## 2021-03-21 NOTE — ED NOTES
Genitourinary - Genitourinary Comment: pt comes in with left flank pain that has been constant since 11 am. rates pain 8/10 took tylenol at around noon. no nausea. last BM< was this am and was normal, voiding normal without difficulty. no nausea no SOB. no injury.

## 2021-03-21 NOTE — ED NOTES
Pt states he is feeling pain around diaphram after getting dose of morphine and zofran. Pt denies any SOB. Pt states pain has improved after meds. MD informed. EKG ordered.

## 2021-03-21 NOTE — DISCHARGE INSTRUCTIONS
1. Drink plenty of fluids at home.  2. Take ibuprofen 600 to 800 mg by mouth every 6 to 8 hours as needed for pain or fever  3. Take acetaminophen 500 to 1000 mg by mouth every 4 to 6 hours as needed for pain or fever.  Do not take more than 4000 mg in 24 hours.  Do not take within 6 hours of another acetaminophen containing medication such as norco (vicodin) or percocet.  4. Take oxycodone as prescribed as needed for breakthrough pain.  5. Please follow-up with your primary care doctor or Urology as needed.  6. Please return to the ED as needed for new or worsening symptoms such as fever greater than 100.4 F, severe and uncontrollable pain vomiting and unable to keep anything down, any other worrisome symptoms.    Discharge Instructions  Kidney Stones    Kidney stones are a common problem that can cause a lot of pain but fortunately are usually not dangerous. Kidney stones form in the kidney and then can cause a blockage (obstruction) of the flow of urine from the kidney which leads to pain. Most patients can manage kidney stones at home (without a hospital stay).  However, sometimes your condition may be worse than it seemed at first, or may get worse with time. Most kidney stones will pass on their own, but occasionally stones may need to be removed by an urologist.    Generally, every Emergency Department visit should have a follow-up clinic visit with either a primary or a specialty clinic/provider. Please follow-up as instructed by your emergency provider today.      Return to the Emergency Department if:  Your pain is not controlled despite the medications provided or recommended.  You are vomiting (throwing up) and cannot keep fluids or medications down.  You develop a fever (>100.4 F).  You feel much more ill or develop new symptoms.  What can I do to help myself?  Be sure to drink plenty of fluids.  If instructed to do so, strain your urine (pee) with the urine strainer you were provided with today. Your  stone may look like a grain of sand or a small pebble. Collect any stones in the cup provided and bring to your follow-up appointment.  Staying active is good, and may help the stone to pass. You may do whatever you feel up to doing without restrictions.   Treatment:  Non-steroidal anti-inflammatory drugs (NSAIDs). This includes prescription medicines like Toradol  (ketorolac) and non-prescription medicines like Advil  (ibuprofen) and Nuprin  (ibuprofen) and Naproxen. These pain relievers are very effective for kidney stones.  Nausea (sick to your stomach) medication.  Nausea and vomiting are common with kidney stones, so your provider may send you home with medicine for this.   Flomax  (tamsulosin). This medicine is sometimes used for men with prostate problems, but also can help kidney stones to pass. Its effectiveness is controversial or questionable so it is prescribed in certain situations. This medicine can lower blood pressure, and you may feel faint/lightheaded, especially when you first stand up. Be sure to get up gradually, sit down if you feel faint, and avoid activity where feeling faint would be dangerous, such as climbing ladders.  If you were given a prescription for medicine here today, be sure to read all of the information (including the package insert) that comes with your prescription.  This will include important information about the medicine, its side effects, and any warnings that you need to know about.  The pharmacist who fills the prescription can provide more information and answer questions you may have about the medicine.  If you have questions or concerns that the pharmacist cannot address, please call or return to the Emergency Department.   Remember that you can always come back to the Emergency Department if you are not able to see your regular provider in the amount of time listed above, if you get any new symptoms, or if there is anything that worries you.     CHEST PAIN

## 2021-03-22 LAB — INTERPRETATION ECG - MUSE: NORMAL

## 2021-05-27 ENCOUNTER — OFFICE VISIT (OUTPATIENT)
Dept: FAMILY MEDICINE | Facility: CLINIC | Age: 61
End: 2021-05-27
Payer: COMMERCIAL

## 2021-05-27 VITALS
RESPIRATION RATE: 14 BRPM | WEIGHT: 143 LBS | SYSTOLIC BLOOD PRESSURE: 124 MMHG | TEMPERATURE: 98.1 F | DIASTOLIC BLOOD PRESSURE: 80 MMHG | BODY MASS INDEX: 19.37 KG/M2 | HEART RATE: 80 BPM | HEIGHT: 72 IN

## 2021-05-27 DIAGNOSIS — Z23 NEED FOR MENINGOCOCCAL VACCINATION: ICD-10-CM

## 2021-05-27 DIAGNOSIS — Z23 NEED FOR IMMUNIZATION AGAINST TYPHOID: ICD-10-CM

## 2021-05-27 DIAGNOSIS — Z23 NEED FOR HEPATITIS A IMMUNIZATION: ICD-10-CM

## 2021-05-27 DIAGNOSIS — Z23 NEED FOR DIPHTHERIA-TETANUS-PERTUSSIS (TDAP) VACCINE: ICD-10-CM

## 2021-05-27 DIAGNOSIS — Z71.84 ENCOUNTER FOR COUNSELING FOR TRAVEL: Primary | ICD-10-CM

## 2021-05-27 PROCEDURE — 90691 TYPHOID VACCINE IM: CPT

## 2021-05-27 PROCEDURE — 90734 MENACWYD/MENACWYCRM VACC IM: CPT | Performed by: PHYSICIAN ASSISTANT

## 2021-05-27 PROCEDURE — 90471 IMMUNIZATION ADMIN: CPT | Performed by: PHYSICIAN ASSISTANT

## 2021-05-27 PROCEDURE — 90472 IMMUNIZATION ADMIN EACH ADD: CPT | Performed by: PHYSICIAN ASSISTANT

## 2021-05-27 PROCEDURE — 90715 TDAP VACCINE 7 YRS/> IM: CPT | Performed by: PHYSICIAN ASSISTANT

## 2021-05-27 PROCEDURE — 99401 PREV MED CNSL INDIV APPRX 15: CPT | Mod: 25 | Performed by: PHYSICIAN ASSISTANT

## 2021-05-27 PROCEDURE — 90632 HEPA VACCINE ADULT IM: CPT | Performed by: PHYSICIAN ASSISTANT

## 2021-05-27 RX ORDER — MEFLOQUINE HYDROCHLORIDE 250 MG/1
TABLET ORAL
Qty: 16 TABLET | Refills: 0 | Status: SHIPPED | OUTPATIENT
Start: 2021-05-27

## 2021-05-27 RX ORDER — AZITHROMYCIN 500 MG/1
TABLET, FILM COATED ORAL
Qty: 12 TABLET | Refills: 0 | Status: SHIPPED | OUTPATIENT
Start: 2021-05-27

## 2021-05-27 SDOH — HEALTH STABILITY: MENTAL HEALTH: HOW OFTEN DO YOU HAVE A DRINK CONTAINING ALCOHOL?: NEVER

## 2021-05-27 ASSESSMENT — MIFFLIN-ST. JEOR: SCORE: 1506.64

## 2021-05-27 NOTE — PROGRESS NOTES
Itinerary: (List all countries)  [place], [place], [place]  Departure Date: 6/30/21, Return Date: 8/30/21  Reason for Travel (i.e. business, pleasure): Pleasure  Visiting an urban or rural area? both  Accommodations (i.e. hotel, hostel, friends, family etc.): Family    IMMUNIZATION HISTORY  Have you received any vaccinations in the past 4 weeks?  No   Have you ever fainted from having your blood drawn or from an injection?  No  Have you ever had a fever reaction to a vaccination?  No  Have you had any bad reaction / side effect from any vaccination?  No  Have you ever had hepatitis A or B vaccine?  No  Do you live (or work closely with anyone who has AIDS, or any other immune disorder, or who is on chemotherapy for cancer or family history of immunodeficiency?  No  Have you received any injection of immune globulin or any blood products during the past 12 months?  No    GENERAL MEDICAL HISTORY  Do you have a medical condition that warrants maintenance meds or physician follow-up?  No  Do you have a medical condition that is stable now, but that may recur while traveling?  No  Has your spleen been removed?   No  Have you had an acute illness or a fever in the past 48 hours?  No  Are you pregnant or might you become pregnant on this trip? Any chance of pregnancy?  No  Are you breastfeeding?  No  Do you have HIV, AIDS, an AIDS-like condition, any other immune disorder, leukemia or cancer?  No  Do you have a severe combined immunodeficiency disease?  No  Have you had your thymus gland removed or a history of problems with your thymus, such as myasthenia gravis, DiGeorge syndrome, or thymoma?  No  Do you have severe thrombocytopenia (low platelet count) or blood clotting disorder?  No  Have you ever had a convulsion, seizure, epilepsy, neurologic condition or brain infection?  No  Do you have any stomach conditions?  No  Do you have a G6PD deficiency?  No  Do you have severe renal or kidney impairment?  No  Do you have a  history of psychiatric problems?  No  Do you have a problem with strange dreams and/or nightmares?  No  Do you have insomnia?  No  Do you have problems with vaginitis?  No  Do you have psoriasis?  No  Are you prone to motion sickness?  No  Have you ever had headaches, nausea, vomiting or breathing problems from altitude exposure?  No    MEDICATIONS  ARE YOU TAKING:  Steroids, prednisone, or anti-cancer drugs?  No  Antibiotics or sulfonamides?  No  Oral contraceptives?  No  Aspirin therapy? (children & adolescents)  No    ALLERGIES  ARE YOU ALLERGIC TO:  Any medications?  No  Any foods or other?  No    Immunizations discussed include: Yellow fever, Hepatitis A, Typhoid, Meningococcus and Tetanus/Diphtheria  Malaraia prophylaxis recommended: mefloquine  Symptomatic treatment for traveler's diarrhea: bismuth subsalicylate, loperamide/diphenoxylate and azithromycin    ASSESSMENT/PLAN:    (Z71.84) Encounter for counseling for travel  (primary encounter diagnosis)    Comment: Hepatitis A, tdap, menactra, and typhoid vaccines today. Patient will return or follow-up with PCP in 6 months for Hep A booster. Prophylaxis given for Traveler's diarrhea and Malaria. All questions were answered.     Plan: mefloquine (LARIAM) 250 MG tablet, azithromycin        (ZITHROMAX) 500 MG tablet, Asymptomatic         COVID-19 Virus (Coronavirus) by PCR            (Z23) Need for hepatitis A immunization  Comment:   Plan: HEPATITIS A VACCINE (ADULT)            (Z23) Need for diphtheria-tetanus-pertussis (Tdap) vaccine  Comment:   Plan: TDAP VACCINE (Adacel, Boostrix)  [7160017]            (Z23) Need for meningococcal vaccination  Comment:   Plan: MCV4, MENINGOCOCCAL CONJ, IM (9 MO - 55 YRS) -         Menactra            (Z23) Need for immunization against typhoid  Comment:   Plan: TYPHOID VACCINE, IM              I have reviewed general recommendations for safe travel including: food/water precautions, insect avoidance, safe sex practices given  high prevalence of HIV and other STDs, roadway safety. Educational materials and links to the CDC Traveler's health website have been provided.    Total time 15 minutes, greater than 50 percent in counseling and coordination of care.

## 2021-05-27 NOTE — PATIENT INSTRUCTIONS
"See travel packet provided  Recommend ultrathon (mosquito repellant), pepto bismol and imodium  The food and drink choices you make while traveling can impact your likelihood of getting sick.   If you aren't sure if a food or drink is safe, the saying \" BOIL IT, COOK IT, PEEL IT, OR FORGET IT\" can help you decide whether it's okay to consume.   Also bring hand  and sun screen with you.  Safe Travels       Today May 27, 2021 you received the    Hepatitis A Vaccine - Please return on 11/27/21 or later for your 2nd and final dose.    Yellow Fever (YF)    Tetanus (Tdap) Vaccine    Meningococcal (Menactra) Vaccine    Typhoid - injectable. This vaccine is valid for two years.   .    These appointments can be made as a NURSE ONLY visit.    **It is very important for the vaccinations to be given on the scheduled day(s), this helps ensure you receive the full effectiveness of the vaccine.**    Please call [facility] with any questions [phone]    Thank you for visiting [facility]    "

## 2021-05-28 ENCOUNTER — TELEPHONE (OUTPATIENT)
Dept: SCHEDULING | Facility: CLINIC | Age: 61
End: 2021-05-28

## 2021-05-28 NOTE — TELEPHONE ENCOUNTER
Reason for Call:  Other appointment    Detailed comments: patient called wanting to schedule his family of 7 for 6/1/2021. They are currently scheduled for 6/2 but unable to come that day. There aren't openings for the family but he would like to come that day.   [id number] Aisha Marshhi  [id number] MRN [id number] Emily Zaid  [id number] MRN [id number] Loisderic Zaid  [id number] MRN [id number] Alissa Zaid  [id number] MRN [id number] Sameula Zaid  [id number] MRN [id number] Art Mar    Phone Number Patient can be reached at: Cell number on file:    Telephone Information:   Mobile [phone]       Best Time: any    Can we leave a detailed message on this number? YES    Call taken on 5/28/2021 at 9:38 AM by Mary Ovalle

## 2021-06-01 ENCOUNTER — RECORDS - HEALTHEAST (OUTPATIENT)
Dept: ADMINISTRATIVE | Facility: CLINIC | Age: 61
End: 2021-06-01

## 2021-06-02 ENCOUNTER — RECORDS - HEALTHEAST (OUTPATIENT)
Dept: ADMINISTRATIVE | Facility: CLINIC | Age: 61
End: 2021-06-02

## 2021-06-04 VITALS
SYSTOLIC BLOOD PRESSURE: 125 MMHG | BODY MASS INDEX: 19 KG/M2 | OXYGEN SATURATION: 100 % | WEIGHT: 144 LBS | HEART RATE: 62 BPM | DIASTOLIC BLOOD PRESSURE: 85 MMHG

## 2021-06-05 NOTE — TELEPHONE ENCOUNTER
Called patient and informed him that we only do Urine for drug screening. Patient verbalized understanding and was ok with proceeding making an appointment. Patient has scheduled an appointment with Dr. Sorensen. For 01/16/20 at 10:20am.    NELIDA/NATALIA

## 2021-06-05 NOTE — TELEPHONE ENCOUNTER
New Appointment Needed  What is the reason for the visit:    Same Date/Next Day Appt Request  What is the reason for your visit?:  New patient appointment, Medical clearance letter and drug testing ( patient asked for urine testing and breathalyzer, if able to do testing in clinic) - Patient has not been seen in since 2015.    Provider Preference: Any available  How soon do you need to be seen?: today or tomorrow   Waitlist offered?: No, patient opted to have message sent to care team first.  Okay to leave a detailed message:  Yes

## 2021-06-05 NOTE — PROGRESS NOTES
OFFICE VISIT - FAMILY MEDICINE     ASSESSMENT AND PLAN     1. Encounter for urine test  Pain Management Drug Panel, Urine   2. Screen for colon cancer  Ambulatory referral for Colonoscopy   Screening drug test done today, patient will obtain results through my chart.  Healthcare maintenance discussed, risk and benefit of colon cancer discussed, order was signed.    CHIEF COMPLAINT   Request For Letter (Medical clearance letter - Urine drug screen.)    HPI   Jorge L Brower is a 59 y.o. male.  No Patient Care Coordination Note on file.    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  Denies ever using illicit drugs.  Does have mild asthma, controlled with Qvar prescribed as daily, but only uses as needed and albuterol as needed.      Review of Systems As per HPI, otherwise negative.    OBJECTIVE   /85 (Patient Site: Right Arm, Patient Position: Sitting, Cuff Size: Adult Regular)   Pulse 62   Wt 144 lb (65.3 kg) Comment: shoes on  SpO2 100%   Physical Exam   Constitutional: He appears well-developed and well-nourished.   Psychiatric: He has a normal mood and affect. His behavior is normal. Thought content normal.       PFSH   No family history on file.  Social History     Socioeconomic History     Marital status:      Spouse name: Not on file     Number of children: Not on file     Years of education: Not on file     Highest education level: Not on file   Occupational History     Not on file   Social Needs     Financial resource strain: Not on file     Food insecurity:     Worry: Not on file     Inability: Not on file     Transportation needs:     Medical: Not on file     Non-medical: Not on file   Tobacco Use     Smoking status: Former Smoker     Smokeless tobacco: Never Used   Substance and Sexual Activity     Alcohol use: Not on file     Drug use: Not on file     Sexual  activity: Not on file   Lifestyle     Physical activity:     Days per week: Not on file     Minutes per session: Not on file     Stress: Not on file   Relationships     Social connections:     Talks on phone: Not on file     Gets together: Not on file     Attends [employer] service: Not on file     Active member of club or organization: Not on file     Attends meetings of clubs or organizations: Not on file     Relationship status: Not on file     Intimate partner violence:     Fear of current or ex partner: Not on file     Emotionally abused: Not on file     Physically abused: Not on file     Forced sexual activity: Not on file   Other Topics Concern     Not on file   Social History Narrative     Not on file     Relevant history was reviewed with the patient today, unless noted in HPI, nothing is pertinent for this visit.  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   Pt had   positive Mantoux but negative CXR; s/p INH prophylaxis x ?6 mos in the 1980s.    No documentation available. 04/2011 Quantiferon TB positive, ABNORMAL CXR         Acute Sinusitis      Overview Note:     Created by Conversion         Gout      Overview Note:     Created by Conversion         Open Wound Of The Right Thumb      Overview Note:     Created by Conversion    Replacement Utility updated for latest IMO load       Perioral Dermatitis      Overview Note:     Created by Conversion         Unusual Behavior      Overview Note:     Created by Conversion         Past Surgical History:    Procedure Laterality Date     MS REMOVAL GALLBLADDER      Description: Cholecystectomy;  Recorded: 04/18/2011;       RESULTS/CONSULTS (Lab/Rad)   No results found for this or any previous visit (from the past 168 hour(s)).  No results found.  MEDICATIONS     Current Outpatient Medications on File Prior to Visit   Medication Sig Dispense Refill     multivitamin therapeutic tablet Take 1 tablet by mouth daily.       albuterol (PROVENTIL HFA;VENTOLIN HFA) 90 mcg/actuation inhaler Inhale 2 puffs every 4 (four) hours as needed for wheezing. 1 Inhaler 0     beclomethasone (QVAR) 80 mcg/actuation inhaler Inhale 2 puffs 2 (two) times a day. Rinse after use 1 Inhaler 12     No current facility-administered medications on file prior to visit.        HEALTH MAINTENANCE / SCREENING   PHQ-2 Total Score: 0 (1/16/2020 10:31 AM)  , No data recorded,No data recorded  Immunization History   Administered Date(s) Administered     Hep A, historic [birth year]01/1898     Hep B, historic 01[birth year]01/1898     Influenza, seasonal,quad inj 6-35 mos 10/04/2010, 10/31/2014     MMR 05/02/2011     Pneumo Conj 13-V (2010&after) 08/25/2016     Td,adult,historic,unspecified 04/18/2011     Tdap 10/04/2010     Varicella 05/02/2011, 06/01/2011     Health Maintenance   Topic     HEPATITIS C SCREENING      PREVENTIVE CARE VISIT      HIV SCREENING      LIPID      COLONOSCOPY      ZOSTER VACCINES (1 of 2)     INFLUENZA VACCINE RULE BASED (1)     TD 18+ HE      ADVANCE CARE PLANNING      TDAP ADULT ONE TIME DOSE        Milla Sorensen MD  Family Medicine, [facility]     This note was dictated using a voice recognition software.  Any grammatical or context distortion are unintentional and inherent to the software.

## 2021-06-07 ENCOUNTER — AMBULATORY - HEALTHEAST (OUTPATIENT)
Dept: FAMILY MEDICINE | Facility: CLINIC | Age: 61
End: 2021-06-07

## 2021-06-07 DIAGNOSIS — Z20.822 EXPOSURE TO COVID-19 VIRUS: ICD-10-CM

## 2021-06-09 ENCOUNTER — ALLIED HEALTH/NURSE VISIT (OUTPATIENT)
Dept: FAMILY MEDICINE | Facility: CLINIC | Age: 61
End: 2021-06-09
Payer: COMMERCIAL

## 2021-06-09 DIAGNOSIS — Z23 NEED FOR IMMUNIZATION AGAINST YELLOW FEVER: Primary | ICD-10-CM

## 2021-06-09 PROCEDURE — 90471 IMMUNIZATION ADMIN: CPT

## 2021-06-09 PROCEDURE — 99207 PR NO CHARGE NURSE ONLY: CPT

## 2021-06-09 PROCEDURE — 90717 YELLOW FEVER VACCINE SUBQ: CPT

## 2021-06-20 NOTE — LETTER
Letter by Milla Sorensen MD at      Author: Milla Sorensen MD Service: -- Author Type: --    Filed:  Encounter Date: 1/16/2020 Status: Signed         January 16, 2020     Patient: Jorge L Brower   YOB: 1960   Date of Visit: 1/16/2020       To Whom it May Concern:    Jorge L Brower was seen in my clinic on 1/16/2020.He is not having any signs of Withdrawal, no drugs use.   Urine drug screen was done today  As the patient request,result is pending    If you have any questions or concerns, please don't hesitate to call.    Sincerely,         Electronically signed by Milla Sorensen MD

## 2021-06-20 NOTE — LETTER
Letter by Milla Sorensen MD at      Author: Milla Sorensen MD Service: -- Author Type: --    Filed:  Encounter Date: 1/20/2020 Status: Signed         Coatsnoah Brower  [address]  [place] [address]             January 20, 2020         Dear Mr. Brower,    Below are the results from your recent visit:    Resulted Orders   Pain Management Drug Panel, Urine   Result Value Ref Range    Methadone (cutoff 150 ng/mL) Not Detected     Propoxyphene (cutoff 300 ng/mL) Not Detected     Tramadol (cutoff 200 ng/mL) Not Detected     Benzoylecgonine (cutoff 150 ng/mL) Not Detected     Barbiturates (cutoff 200 ng/mL) Not Detected     Ethyl Glucuronide (cutoff 500 ng/mL) Present     Marijuana Metabolite (cutoff 20 ng/mL) Not Detected     PCP (cutoff 25 ng/mL) Not Detected     Carisoprodol (cut-off 100 ng/mL) Not Detected       Comment:        The carisoprodol immunoassay has cross-reactivity to carisoprodol   and meprobamate.    Codeine (cutoff 40 ng/mL) Not Detected     Morphine (cutoff 20 ng/mL) Not Detected     6-acetylmorphine (cutoff 20 ng/mL) Not Detected     Oxycodone (cutoff 40 ng/mL) Not Detected     Noroxycodone (cutoff 100 ng/mL) Not Detected     Oxymorphone (cutoff 40 ng/mL) Not Detected     Noroxymorphone (cutoff 100 ng/mL) Not Detected     Hydrocodone (cutoff 40 ng/mL) Not Detected     Norhydrocodone (cutoff 100 ng/mL) Not Detected     Hydromorphone (cutoff 40 ng/mL) Not Detected     Buprenorphine (cutoff 5 ng/mL) Not Detected     Norbuprenorphine (cutoff 20 ng/mL) Not Detected     Fentanyl (cutoff 2 ng/mL) Not Detected     Norfentanyl (cutoff 2 ng/mL) Not Detected     Meperidine metabolite (cutoff 50 ng/mL) Not Detected     Tapentadol (cutoff 100 ng/mL) Not Detected     Tapentadol-o-Sulf (cutoff 200 ng/mL) Not Detected     Amphetamine (cutoff 100 ng/mL) Not Detected     Methamphetamine (cutoff 400 ng/mL) Not Detected     MDMA- Ecstasy (cutoff 200 ng/mL) Not Detected     MDA  (cutoff 200 ng/mL) Not Detected     MDEA- Lenore (cutoff 200 ng/mL) Not Detected     Phentermine (cutoff 100 ng/mL) Not Detected     Methylphenidate (cutoff 100 ng/mL) Not Detected     Alprazolam (cutoff 40 ng/mL) Not Detected     Alpha-OH-Alprazolam (cutoff 20 ng/mL) Not Detected     Clonazepam (cutoff 20 ng/mL) Not Detected     7-Aminoclonazepam (cutoff 40 ng/mL) Not Detected     Diazepam (cutoff 50 ng/mL) Not Detected     Nordiazepam (cutoff 50 ng/mL) Not Detected     Oxazepam (cutoff 50 ng/mL) Not Detected     Temazepam (cutoff 50 ng/mL) Not Detected     Lorazepam (cutoff 60 ng/mL) Not Detected     Midazolam (cutoff 20 ng/mL) Not Detected     Zolpidem (cutoff 20 ng/mL) Not Detected     Creatinine, Urine 236.1 20.0 - 400.0 mg/dL    Pain Management Drug Panel See Below       Comment:      Methodology: Qualitative Enzyme Immunoassay and Qualitative Liquid   Chromatography-Time of Flight-Mass Spectrometry or Tandem Mass   Spectrometry, Quantitative Spectrophotometry    The absence of expected drug(s) and/or drug metabolite(s) may   indicate non-compliance, inappropriate timing of specimen   collection relative to drug administration, poor drug absorption,   diluted/adulterated urine, or limitations of testing. The   concentration must be greater than or equal to the cutoff to be   reported as present.  If specific drug concentrations are   required, contact the laboratory within two weeks of specimen   collection to request quantification by a second analytical   technique. Interpretive questions should be directed to the   laboratory.    Results based on immunoassay detection that do not match clinical   expectations should be  interpreted with caution. Confirmatory testing by mass   spectrometry for immunoassay-based results is available, if   ordered within two weeks   of specimen collection. Additional   charges apply.    For medical purposes only; not valid for forensic use.    This test was developed and its  performance characteristics   determined by [facility]. The U.S. Food and Drug   Administration has not approved or cleared this test; however, FDA   clearance or approval is not currently required for clinical use.   The results are not intended to be used as the sole means for   clinical diagnosis or patient management decisions.    [facility], Mass Spec/EMITU See Note       Comment:      Access [employer] Enhanced Report using either link below:     -Direct access: [email or web address]     -Enter Username, Password: [email or web address]   Username: [email or web address]   Password: [email or web address]  Performed by [facility],  [address] [place] [address] [phone]  [email or web address], Compa Swanson MD, Lab. Director       Urine drug screen only shows metabolite of alcohol, otherwise negative.  Try to minimize alcohol intake, no more than 2 drinks per day.  Follow-up if there is any concern.    Please call with questions or contact us using [employer].    Sincerely,        Electronically signed by Milla Sorensen MD

## 2021-08-15 ENCOUNTER — HEALTH MAINTENANCE LETTER (OUTPATIENT)
Age: 61
End: 2021-08-15

## 2021-10-10 ENCOUNTER — HEALTH MAINTENANCE LETTER (OUTPATIENT)
Age: 61
End: 2021-10-10

## 2022-09-18 ENCOUNTER — HEALTH MAINTENANCE LETTER (OUTPATIENT)
Age: 62
End: 2022-09-18

## 2023-09-06 ENCOUNTER — TELEPHONE (OUTPATIENT)
Dept: SCHEDULING | Facility: CLINIC | Age: 63
End: 2023-09-06
Payer: COMMERCIAL

## 2023-09-06 NOTE — TELEPHONE ENCOUNTER
Spoke with pt that  does not have appointment for 9/6. Advised to check other locations, or UC at 10 am    Rhona Casas

## 2023-10-08 ENCOUNTER — HEALTH MAINTENANCE LETTER (OUTPATIENT)
Age: 63
End: 2023-10-08

## 2025-01-14 ENCOUNTER — APPOINTMENT (OUTPATIENT)
Dept: CT IMAGING | Facility: CLINIC | Age: 65
End: 2025-01-14
Attending: EMERGENCY MEDICINE
Payer: COMMERCIAL

## 2025-01-14 ENCOUNTER — HOSPITAL ENCOUNTER (EMERGENCY)
Facility: CLINIC | Age: 65
Discharge: HOME | End: 2025-01-14
Attending: EMERGENCY MEDICINE
Payer: COMMERCIAL

## 2025-01-14 VITALS
OXYGEN SATURATION: 100 % | SYSTOLIC BLOOD PRESSURE: 125 MMHG | HEIGHT: 73 IN | RESPIRATION RATE: 18 BRPM | WEIGHT: 161 LBS | BODY MASS INDEX: 21.34 KG/M2 | TEMPERATURE: 98.2 F | HEART RATE: 82 BPM | DIASTOLIC BLOOD PRESSURE: 76 MMHG

## 2025-01-14 DIAGNOSIS — M54.50 ACUTE BILATERAL LOW BACK PAIN WITHOUT SCIATICA: ICD-10-CM

## 2025-01-14 LAB
ALBUMIN SERPL BCG-MCNC: 4.5 G/DL (ref 3.5–5.2)
ALBUMIN UR-MCNC: NEGATIVE MG/DL
ALP SERPL-CCNC: 58 U/L (ref 40–150)
ALT SERPL W P-5'-P-CCNC: 21 U/L (ref 0–70)
ANION GAP SERPL CALCULATED.3IONS-SCNC: 14 MMOL/L (ref 7–15)
APPEARANCE UR: CLEAR
AST SERPL W P-5'-P-CCNC: 27 U/L (ref 0–45)
BASOPHILS # BLD AUTO: 0.1 10E3/UL (ref 0–0.2)
BASOPHILS NFR BLD AUTO: 1 %
BILIRUB DIRECT SERPL-MCNC: <0.2 MG/DL (ref 0–0.3)
BILIRUB SERPL-MCNC: 0.4 MG/DL
BILIRUB UR QL STRIP: NEGATIVE
BUN SERPL-MCNC: 15.3 MG/DL (ref 8–23)
CALCIUM SERPL-MCNC: 9.7 MG/DL (ref 8.8–10.4)
CHLORIDE SERPL-SCNC: 100 MMOL/L (ref 98–107)
COLOR UR AUTO: YELLOW
CREAT SERPL-MCNC: 1.03 MG/DL (ref 0.67–1.17)
EGFRCR SERPLBLD CKD-EPI 2021: 82 ML/MIN/1.73M2
EOSINOPHIL # BLD AUTO: 0.2 10E3/UL (ref 0–0.7)
EOSINOPHIL NFR BLD AUTO: 2 %
ERYTHROCYTE [DISTWIDTH] IN BLOOD BY AUTOMATED COUNT: 12.5 % (ref 10–15)
FLUAV RNA SPEC QL NAA+PROBE: NEGATIVE
FLUBV RNA RESP QL NAA+PROBE: NEGATIVE
GLUCOSE SERPL-MCNC: 108 MG/DL (ref 70–99)
GLUCOSE UR STRIP-MCNC: NEGATIVE MG/DL
HCO3 SERPL-SCNC: 22 MMOL/L (ref 22–29)
HCT VFR BLD AUTO: 45.9 % (ref 40–53)
HGB BLD-MCNC: 15.6 G/DL (ref 13.3–17.7)
HGB UR QL STRIP: NEGATIVE
IMM GRANULOCYTES # BLD: 0 10E3/UL
IMM GRANULOCYTES NFR BLD: 0 %
KETONES UR STRIP-MCNC: 10 MG/DL
LEUKOCYTE ESTERASE UR QL STRIP: NEGATIVE
LYMPHOCYTES # BLD AUTO: 2.8 10E3/UL (ref 0.8–5.3)
LYMPHOCYTES NFR BLD AUTO: 26 %
MCH RBC QN AUTO: 31 PG (ref 26.5–33)
MCHC RBC AUTO-ENTMCNC: 34 G/DL (ref 31.5–36.5)
MCV RBC AUTO: 91 FL (ref 78–100)
MONOCYTES # BLD AUTO: 0.8 10E3/UL (ref 0–1.3)
MONOCYTES NFR BLD AUTO: 7 %
MUCOUS THREADS #/AREA URNS LPF: PRESENT /LPF
NEUTROPHILS # BLD AUTO: 7 10E3/UL (ref 1.6–8.3)
NEUTROPHILS NFR BLD AUTO: 64 %
NITRATE UR QL: NEGATIVE
NRBC # BLD AUTO: 0 10E3/UL
NRBC BLD AUTO-RTO: 0 /100
PH UR STRIP: 5.5 [PH] (ref 5–7)
PLATELET # BLD AUTO: 275 10E3/UL (ref 150–450)
POTASSIUM SERPL-SCNC: 4.8 MMOL/L (ref 3.4–5.3)
PROT SERPL-MCNC: 7.2 G/DL (ref 6.4–8.3)
RBC # BLD AUTO: 5.04 10E6/UL (ref 4.4–5.9)
RBC URINE: 1 /HPF
RSV RNA SPEC NAA+PROBE: NEGATIVE
SARS-COV-2 RNA RESP QL NAA+PROBE: NEGATIVE
SODIUM SERPL-SCNC: 136 MMOL/L (ref 135–145)
SP GR UR STRIP: 1.02 (ref 1–1.03)
UROBILINOGEN UR STRIP-MCNC: NORMAL MG/DL
WBC # BLD AUTO: 11 10E3/UL (ref 4–11)
WBC URINE: 1 /HPF

## 2025-01-14 PROCEDURE — 82248 BILIRUBIN DIRECT: CPT | Performed by: EMERGENCY MEDICINE

## 2025-01-14 PROCEDURE — 96374 THER/PROPH/DIAG INJ IV PUSH: CPT

## 2025-01-14 PROCEDURE — 250N000013 HC RX MED GY IP 250 OP 250 PS 637: Performed by: EMERGENCY MEDICINE

## 2025-01-14 PROCEDURE — 81001 URINALYSIS AUTO W/SCOPE: CPT | Performed by: EMERGENCY MEDICINE

## 2025-01-14 PROCEDURE — 80076 HEPATIC FUNCTION PANEL: CPT | Performed by: EMERGENCY MEDICINE

## 2025-01-14 PROCEDURE — 85004 AUTOMATED DIFF WBC COUNT: CPT | Performed by: EMERGENCY MEDICINE

## 2025-01-14 PROCEDURE — 99285 EMERGENCY DEPT VISIT HI MDM: CPT | Mod: 25

## 2025-01-14 PROCEDURE — 36415 COLL VENOUS BLD VENIPUNCTURE: CPT | Performed by: EMERGENCY MEDICINE

## 2025-01-14 PROCEDURE — 87637 SARSCOV2&INF A&B&RSV AMP PRB: CPT | Performed by: EMERGENCY MEDICINE

## 2025-01-14 PROCEDURE — 250N000011 HC RX IP 250 OP 636: Performed by: EMERGENCY MEDICINE

## 2025-01-14 PROCEDURE — 74176 CT ABD & PELVIS W/O CONTRAST: CPT

## 2025-01-14 PROCEDURE — 85041 AUTOMATED RBC COUNT: CPT | Performed by: EMERGENCY MEDICINE

## 2025-01-14 PROCEDURE — 82947 ASSAY GLUCOSE BLOOD QUANT: CPT | Performed by: EMERGENCY MEDICINE

## 2025-01-14 RX ORDER — OXYCODONE HYDROCHLORIDE 5 MG/1
5 TABLET ORAL ONCE
Status: COMPLETED | OUTPATIENT
Start: 2025-01-14 | End: 2025-01-14

## 2025-01-14 RX ORDER — ONDANSETRON 2 MG/ML
4 INJECTION INTRAMUSCULAR; INTRAVENOUS EVERY 30 MIN PRN
Status: DISCONTINUED | OUTPATIENT
Start: 2025-01-14 | End: 2025-01-14 | Stop reason: HOSPADM

## 2025-01-14 RX ORDER — CYCLOBENZAPRINE HCL 10 MG
10 TABLET ORAL 3 TIMES DAILY PRN
Qty: 21 TABLET | Refills: 0 | Status: SHIPPED | OUTPATIENT
Start: 2025-01-14 | End: 2025-01-21

## 2025-01-14 RX ORDER — CYCLOBENZAPRINE HCL 10 MG
10 TABLET ORAL ONCE
Status: COMPLETED | OUTPATIENT
Start: 2025-01-14 | End: 2025-01-14

## 2025-01-14 RX ORDER — LIDOCAINE 4 G/G
1 PATCH TOPICAL ONCE
Status: DISCONTINUED | OUTPATIENT
Start: 2025-01-14 | End: 2025-01-14 | Stop reason: HOSPADM

## 2025-01-14 RX ADMIN — ONDANSETRON 4 MG: 2 INJECTION INTRAMUSCULAR; INTRAVENOUS at 18:02

## 2025-01-14 RX ADMIN — OXYCODONE HYDROCHLORIDE 5 MG: 5 TABLET ORAL at 18:02

## 2025-01-14 RX ADMIN — CYCLOBENZAPRINE 10 MG: 10 TABLET, FILM COATED ORAL at 19:57

## 2025-01-14 RX ADMIN — LIDOCAINE 1 PATCH: 560 PATCH PERCUTANEOUS; TOPICAL; TRANSDERMAL at 19:58

## 2025-01-14 ASSESSMENT — ACTIVITIES OF DAILY LIVING (ADL)
ADLS_ACUITY_SCORE: 41

## 2025-01-14 ASSESSMENT — COLUMBIA-SUICIDE SEVERITY RATING SCALE - C-SSRS
2. HAVE YOU ACTUALLY HAD ANY THOUGHTS OF KILLING YOURSELF IN THE PAST MONTH?: NO
1. IN THE PAST MONTH, HAVE YOU WISHED YOU WERE DEAD OR WISHED YOU COULD GO TO SLEEP AND NOT WAKE UP?: NO
6. HAVE YOU EVER DONE ANYTHING, STARTED TO DO ANYTHING, OR PREPARED TO DO ANYTHING TO END YOUR LIFE?: NO

## 2025-01-14 NOTE — ED PROVIDER NOTES
Emergency Department Note      History of Present Illness     Chief Complaint   Back Pain      HPI   Jorge L Brower is a 64 year old male with history of kidney stone who presents to the ED for 4 days of back pain. The patient complains of constant low back pain which started on the right side and has since moved to the left side. He has been experiencing a dry cough for the past 2-3 days which worsens his back pain. His back pain is also worsened by rolling onto his sides when laying down. He also complains of pain across his diaphragm and some occasional bloating. The patient has been managing his back pain by taking 1000 mg of Tylenol twice a day and 600 mg of ibuprofen twice a day. The patient took Tylenol around 1000 this morning. The patient adds that his back pain feels completely different from his past kidney stone. Denies fever, nausea, vomiting, diarrhea, dysuria, and hematuria. The patient notes that he and his wife were sick recently with a cold, fever, and headache. No past medical problems. No bowel bladder incontinence with the back pain.  The back pain is localized and does not radiate down to the body or the legs.  Movement seems to increase the pain as well as palpation in the area.  No rash was seen in his back per family.    Independent Historian   None    Review of External Notes   none    Past Medical History     Medical History and Problem List   No past medical history on file.    Medications   albuterol (PROAIR HFA/PROVENTIL HFA/VENTOLIN HFA) 108 (90 Base) MCG/ACT inhaler  albuterol (PROAIR HFA/PROVENTIL HFA/VENTOLIN HFA) 108 (90 Base) MCG/ACT inhaler  azithromycin (ZITHROMAX) 500 MG tablet  benzonatate (TESSALON) 100 MG capsule  guaiFENesin-codeine (ROBITUSSIN AC) 100-10 MG/5ML solution  mefloquine (LARIAM) 250 MG tablet  ondansetron (ZOFRAN ODT) 4 MG ODT tab  oxyCODONE (ROXICODONE) 5 MG tablet        Surgical History   Past Surgical History:   Procedure Laterality Date     "CHOLECYSTECTOMY  2016    [facility] REMOVAL GALLBLADDER      Description: Cholecystectomy;  Recorded: 04/18/2011;       Physical Exam     Patient Vitals for the past 24 hrs:   BP Temp Pulse Resp SpO2 Height Weight   01/14/25 1708 (!) 122/93 98.2  F (36.8  C) 87 18 99 % 1.854 m (6' 1\") 73 kg (161 lb)     Physical Exam  Constitutional:       Appearance: He is well-developed.   HENT:      Right Ear: External ear normal.      Left Ear: External ear normal.      Mouth/Throat:      Mouth: Mucous membranes are moist.      Pharynx: Oropharynx is clear. No oropharyngeal exudate or posterior oropharyngeal erythema.   Eyes:      General: No scleral icterus.     Extraocular Movements: Extraocular movements intact.      Conjunctiva/sclera: Conjunctivae normal.      Pupils: Pupils are equal, round, and reactive to light.   Cardiovascular:      Rate and Rhythm: Normal rate and regular rhythm.      Heart sounds: Normal heart sounds. No murmur heard.     No friction rub. No gallop.   Pulmonary:      Effort: Pulmonary effort is normal. No respiratory distress.      Breath sounds: Normal breath sounds. No wheezing or rales.   Abdominal:      General: Bowel sounds are normal. There is no distension.      Palpations: Abdomen is soft. There is no mass.      Tenderness: There is abdominal tenderness. There is no right CVA tenderness or left CVA tenderness.      Comments: Mild BLQ TTP. No CVAT.   Musculoskeletal:         General: Tenderness present. Normal range of motion.      Right lower leg: No edema.      Left lower leg: No edema.      Comments: Bilateral lower back TTP from L3 down. Gait normal. 5/5 strength in all extremities. Straight leg tests neg BLE. 2+ pulses in BLE with normal sensation   Skin:     General: Skin is warm and dry.      Findings: No rash.   Neurological:      General: No focal deficit present.      Mental Status: He is alert.      Cranial Nerves: No cranial nerve deficit.           Diagnostics     Lab Results   Labs " Ordered and Resulted from Time of ED Arrival to Time of ED Departure   ROUTINE UA WITH MICROSCOPIC REFLEX TO CULTURE - Abnormal       Result Value    Color Urine Yellow      Appearance Urine Clear      Glucose Urine Negative      Bilirubin Urine Negative      Ketones Urine 10 (*)     Specific Gravity Urine 1.025      Blood Urine Negative      pH Urine 5.5      Protein Albumin Urine Negative      Urobilinogen Urine Normal      Nitrite Urine Negative      Leukocyte Esterase Urine Negative      Mucus Urine Present (*)     RBC Urine 1      WBC Urine 1     BASIC METABOLIC PANEL - Abnormal    Sodium 136      Potassium 4.8      Chloride 100      Carbon Dioxide (CO2) 22      Anion Gap 14      Urea Nitrogen 15.3      Creatinine 1.03      GFR Estimate 82      Calcium 9.7      Glucose 108 (*)    HEPATIC FUNCTION PANEL - Normal    Protein Total 7.2      Albumin 4.5      Bilirubin Total 0.4      Alkaline Phosphatase 58      AST 27      ALT 21      Bilirubin Direct <0.20     INFLUENZA A/B, RSV AND SARS-COV2 PCR - Normal    Influenza A PCR Negative      Influenza B PCR Negative      RSV PCR Negative      SARS CoV2 PCR Negative     CBC WITH PLATELETS AND DIFFERENTIAL    WBC Count 11.0      RBC Count 5.04      Hemoglobin 15.6      Hematocrit 45.9      MCV 91      MCH 31.0      MCHC 34.0      RDW 12.5      Platelet Count 275      % Neutrophils 64      % Lymphocytes 26      % Monocytes 7      % Eosinophils 2      % Basophils 1      % Immature Granulocytes 0      NRBCs per 100 WBC 0      Absolute Neutrophils 7.0      Absolute Lymphocytes 2.8      Absolute Monocytes 0.8      Absolute Eosinophils 0.2      Absolute Basophils 0.1      Absolute Immature Granulocytes 0.0      Absolute NRBCs 0.0         Imaging   CT Abdomen Pelvis w/o Contrast   Final Result   IMPRESSION:    1.  Several diminutive bilateral nonobstructing stones.   2.  No ureteral stones or hydronephrosis bilaterally.             Independent Interpretation   None    ED Course       Medications Administered   Medications - No data to display    Procedures   Procedures     Discussion of Management   None    ED Course   ED Course as of 01/14/25 1937   e Jan 14, 2025   1740 I obtained history and performed physical exam as noted above.    1935 I updated the patient and prepared him for discharge.       Additional Documentation  None    Medical Decision Making / Diagnosis     CMS Diagnoses: None    MIPS       None    [facility]   Jorge L Brower is a 64 year old male who presents with bilateral back pain radiating to the front.  He has palpable tenderness and also the pain is aggravated by movement and palpation.  There is no radicular symptoms into her chest lumbar radiculopathy.  Given his history of kidney stone and the radiation to the front, CT was obtained which showed no acute findings.  He was given first dose of Flexeril here as well as Lidoderm patches.  This certainly could be musculoskeletal given the reproducibility in association with movement.  We will have him continue with ibuprofen and Tylenol and start utilizing lidocaine patches.  I will give him prescription of muscle relaxants to help.  He is referred back to his primary care doctor for follow-up.  We discussed that we will try conservative management first with this doctor and possible utilizing physical therapy.  His doctor can then discuss with him if they need to order MRI if things are not improving.  Patient voiced understanding.  Return precaution provided.    Disposition   The patient was discharged.     Diagnosis     ICD-10-CM    1. Acute bilateral low back pain without sciatica  M54.50            Discharge Medications   Discharge Medication List as of 1/14/2025  8:15 PM        START taking these medications    Details   cyclobenzaprine (FLEXERIL) 10 MG tablet Take 1 tablet (10 mg) by mouth 3 times daily as needed for muscle spasms., Disp-21 tablet, R-0, E-Prescribe               Scribe Disclosure:  Ro ROSALES  Duane, am serving as a scribe at 7:43 PM on 1/14/2025 to document services personally performed by Juana Wilson MD based on my observations and the provider's statements to me.        Juana Wilson MD  01/14/25 3101

## 2025-01-14 NOTE — ED TRIAGE NOTES
Patient presents with complaints of atraumatic lower back pain for 5 days. Hx of kidney stones, but pain feels different.

## 2025-01-15 NOTE — DISCHARGE INSTRUCTIONS
Tylenol 1000mg every 8 hours for pain  Motrin 600mg every 8 hours for pain  Apply lidocaine patch to back daily  Flexeril to alleviate back spasm  See your doctor in 2 days  Return for worsening symptoms

## 2025-02-04 ENCOUNTER — OFFICE VISIT (OUTPATIENT)
Dept: FAMILY MEDICINE | Facility: CLINIC | Age: 65
End: 2025-02-04
Payer: COMMERCIAL

## 2025-02-04 VITALS
HEART RATE: 94 BPM | OXYGEN SATURATION: 98 % | SYSTOLIC BLOOD PRESSURE: 145 MMHG | RESPIRATION RATE: 16 BRPM | HEIGHT: 73 IN | BODY MASS INDEX: 20.91 KG/M2 | TEMPERATURE: 98.5 F | DIASTOLIC BLOOD PRESSURE: 96 MMHG | WEIGHT: 157.8 LBS

## 2025-02-04 DIAGNOSIS — R05.8 DRY COUGH: Primary | ICD-10-CM

## 2025-02-04 PROCEDURE — 99203 OFFICE O/P NEW LOW 30 MIN: CPT | Performed by: GENERAL PRACTICE

## 2025-02-04 RX ORDER — PREDNISONE 20 MG/1
20 TABLET ORAL DAILY
Qty: 3 TABLET | Refills: 0 | Status: SHIPPED | OUTPATIENT
Start: 2025-02-04 | End: 2025-02-07

## 2025-02-04 RX ORDER — CODEINE PHOSPHATE AND GUAIFENESIN 10; 100 MG/5ML; MG/5ML
1-2 SOLUTION ORAL
Qty: 118 ML | Refills: 0 | Status: SHIPPED | OUTPATIENT
Start: 2025-02-04

## 2025-02-04 RX ORDER — GUAIFENESIN 600 MG/1
600 TABLET, EXTENDED RELEASE ORAL 2 TIMES DAILY
Qty: 30 TABLET | Refills: 0 | Status: SHIPPED | OUTPATIENT
Start: 2025-02-04

## 2025-02-04 ASSESSMENT — PAIN SCALES - GENERAL: PAINLEVEL_OUTOF10: NO PAIN (0)

## 2025-02-04 ASSESSMENT — ENCOUNTER SYMPTOMS: COUGH: 1

## 2025-02-04 NOTE — PROGRESS NOTES
Assessment & Plan     Dry cough  Stable vitals  Denies acid reflux  Denies nasal congestion  19 y/o has a slight headache at home  Discussed trial of mucinex, prednisone  Follow-up if worsening or no improvement in 1-2 weeks    - guaiFENesin (MUCINEX) 600 MG 12 hr tablet; Take 1 tablet (600 mg) by mouth 2 times daily.  - guaiFENesin-codeine (ROBITUSSIN AC) 100-10 MG/5ML solution; Take 5-10 mLs by mouth nightly as needed for cough.  - predniSONE (DELTASONE) 20 MG tablet; Take 1 tablet (20 mg) by mouth daily for 3 days.                Subjective   Jorge L is a 64 year old, presenting for the following health issues:  Cough      Dry cough for 1 week  Steady  Feels like something is stuck in the chest and needs to clear it up.   Sometimes SOB when he is coughing too much.   No congestion  19 y/o is having a slight headache  Using humidifier    Denies heartburn, no recent travel    Sleep ok at night        History of Present Illness       Reason for visit:  Cough  Symptom onset:  3-7 days ago  Symptoms include:  Hackin cough, Congestion  Symptom intensity:  Severe  Symptom progression:  Staying the same  Had these symptoms before:  No  What makes it worse:  At night if the heat is high  What makes it better:  Black seed oil   He is taking medications regularly.           Acute Illness  Acute illness concerns: Cough, congestion  Onset/Duration: 7 days  Symptoms:  Fever: No  Chills/Sweats: No  Headache (location?): No  Sinus Pressure: YES  Conjunctivitis:  No  Ear Pain: no  Rhinorrhea: No  Congestion: YES  Sore Throat: No  Cough: YES-non-productive  Wheeze: YES  Decreased Appetite: No  Nausea: No  Vomiting: No  Diarrhea: No  Dysuria/Freq.: No  Dysuria or Hematuria: No  Fatigue/Achiness: No  Sick/Strep Exposure: No  Therapies tried and outcome: Black seed oil      Review of Systems  Constitutional, HEENT, cardiovascular, pulmonary, gi and gu systems are negative, except as otherwise noted.      Objective    BP (!) 145/96  "(BP Location: Right arm, Patient Position: Sitting, Cuff Size: Adult Regular)   Pulse 94   Temp 98.5  F (36.9  C) (Oral)   Resp 16   Ht 1.854 m (6' 1\")   Wt 71.6 kg (157 lb 12.8 oz)   SpO2 98%   BMI 20.82 kg/m    Body mass index is 20.82 kg/m .  Physical Exam  Constitutional:       Appearance: Normal appearance.   HENT:      Head: Normocephalic and atraumatic.      Right Ear: Tympanic membrane normal.      Left Ear: Tympanic membrane normal.      Nose: Nose normal.      Mouth/Throat:      Mouth: Mucous membranes are moist.   Eyes:      Extraocular Movements: Extraocular movements intact.   Cardiovascular:      Rate and Rhythm: Normal rate and regular rhythm.   Pulmonary:      Effort: Pulmonary effort is normal.      Breath sounds: Normal breath sounds.   Abdominal:      Palpations: Abdomen is soft.   Musculoskeletal:         General: Normal range of motion.      Cervical back: Normal range of motion.   Skin:     General: Skin is warm.   Neurological:      General: No focal deficit present.      Mental Status: He is alert and oriented to person, place, and time. Mental status is at baseline.   Psychiatric:         Mood and Affect: Mood normal.         Behavior: Behavior normal.         Thought Content: Thought content normal.         Judgment: Judgment normal.                    Signed Electronically by: Mali Velasquez MD    "

## 2025-02-12 ENCOUNTER — MYC MEDICAL ADVICE (OUTPATIENT)
Dept: FAMILY MEDICINE | Facility: CLINIC | Age: 65
End: 2025-02-12
Payer: COMMERCIAL

## 2025-02-12 NOTE — TELEPHONE ENCOUNTER
See  message related to 2/4/25 appt for cough.    Pt was unable to get guaiFENesin-codeine, pharmacy was out.  Prednisone helped subside cough a little, but never went away.  Denies worsening symptoms, just not improving.    Denies diff breathing, SOB, wheezing.  Unable to produce sputum, but feels it in there.  Has been drinking increased fluids.      Offered to schedule 2/12/25 appt, pt declines as he does not have transportation today.  Scheduled follow up appt on 2/13/25.  Advised pt to call back for any new or worsening symptoms.  Pt verbalized understanding and agreeable to plan.    Deedee Ochoa RN, BSN  [facility]

## 2025-02-13 ENCOUNTER — ANCILLARY PROCEDURE (OUTPATIENT)
Dept: GENERAL RADIOLOGY | Facility: CLINIC | Age: 65
End: 2025-02-13
Attending: NURSE PRACTITIONER
Payer: COMMERCIAL

## 2025-02-13 ENCOUNTER — OFFICE VISIT (OUTPATIENT)
Dept: FAMILY MEDICINE | Facility: CLINIC | Age: 65
End: 2025-02-13
Payer: COMMERCIAL

## 2025-02-13 VITALS
RESPIRATION RATE: 20 BRPM | WEIGHT: 158 LBS | TEMPERATURE: 98.3 F | HEART RATE: 92 BPM | HEIGHT: 73 IN | SYSTOLIC BLOOD PRESSURE: 143 MMHG | DIASTOLIC BLOOD PRESSURE: 92 MMHG | BODY MASS INDEX: 20.94 KG/M2 | OXYGEN SATURATION: 100 %

## 2025-02-13 DIAGNOSIS — R05.1 ACUTE COUGH: ICD-10-CM

## 2025-02-13 DIAGNOSIS — R03.0 ELEVATED BLOOD PRESSURE READING WITHOUT DIAGNOSIS OF HYPERTENSION: ICD-10-CM

## 2025-02-13 DIAGNOSIS — R05.1 ACUTE COUGH: Primary | ICD-10-CM

## 2025-02-13 RX ORDER — BENZONATATE 100 MG/1
100 CAPSULE ORAL 3 TIMES DAILY PRN
Qty: 30 CAPSULE | Refills: 0 | Status: SHIPPED | OUTPATIENT
Start: 2025-02-13

## 2025-02-13 ASSESSMENT — ENCOUNTER SYMPTOMS
EYE REDNESS: 0
FEVER: 0
RHINORRHEA: 0
FATIGUE: 0
EYE DISCHARGE: 0
COUGH: 1
CHEST TIGHTNESS: 0
SORE THROAT: 0
SINUS PAIN: 0
CHILLS: 0
TROUBLE SWALLOWING: 0
WHEEZING: 0
NEUROLOGICAL NEGATIVE: 1
SHORTNESS OF BREATH: 0

## 2025-02-13 ASSESSMENT — PAIN SCALES - GENERAL: PAINLEVEL_OUTOF10: NO PAIN (0)

## 2025-02-13 NOTE — PATIENT INSTRUCTIONS
X-ray today.  Overall your exam is normal.   May try new cough tablet for cough. Sent to your pharmacy.    Blood pressure was a bit high today and last time. Please schedule wellness visit with PCP to follow up on this.

## 2025-02-13 NOTE — PROGRESS NOTES
Assessment & Plan     Acute cough  Cough for about 2 weeks. No other symptoms. Otherwise feels well. No sick contacts. Overall reassuring exam today. He requests chest x-ray. Counseled on supportive care. Rx for Tessalon to try. Return precautions discussed. Questions answered. Patient in agreement of plan of care.    - XR Chest 2 Views; Future  - benzonatate (TESSALON) 100 MG capsule; Take 1 capsule (100 mg) by mouth 3 times daily as needed for cough.    Elevated blood pressure reading without diagnosis of hypertension  BP elevated today and at last visit. Pt denies prior issues. He agrees to schedule wellness visit so this can be followed up on when recovered.       See Patient Instructions    Singh Coats is a 64 year old, presenting for the following health issues:  Cough (Follow up)        2/13/2025     3:39 PM   Additional Questions   Roomed by Yvette         2/13/2025     3:39 PM   Patient Reported Additional Medications   Patient reports taking the following new medications none     History of Present Illness       Reason for visit:  Cough  Symptom onset:  3-7 days ago  Symptoms include:  Hackin cough, Congestion  Symptom intensity:  Severe  Symptom progression:  Staying the same  Had these symptoms before:  No  What makes it worse:  At night if the heat is high  What makes it better:  Black seed oil   He is taking medications regularly.     Seen by Dr. Samson for same symptoms 2/4/25. Given prednisone and guaifenesin/codeine. Pharmacy was out of stock of cough syrup. Prednisone maybe helped a little.   Cough is the same-not better or worse. Feels chest is congested but nothing coming up.   No fever. No pulm history.   Interested in chest x-ray.    Review of Systems   Constitutional:  Negative for chills, fatigue and fever.   HENT:  Negative for congestion, ear pain, rhinorrhea, sinus pain, sore throat and trouble swallowing.    Eyes:  Negative for discharge and redness.   Respiratory:  Positive for  "cough. Negative for chest tightness, shortness of breath and wheezing.    Cardiovascular:  Negative for chest pain.   Genitourinary: Negative.    Skin: Negative.    Neurological: Negative.            Objective    BP (!) 143/92 (BP Location: Right arm, Patient Position: Sitting, Cuff Size: Adult Regular)   Pulse 92   Temp 98.3  F (36.8  C) (Oral)   Resp 20   Ht 1.854 m (6' 1\")   Wt 71.7 kg (158 lb)   SpO2 100%   BMI 20.85 kg/m    Body mass index is 20.85 kg/m .    BP Readings from Last 6 Encounters:   02/13/25 (!) 143/92   02/04/25 (!) 145/96   01/14/25 125/76   05/27/21 124/80   03/21/21 132/66   02/17/20 112/78     Physical Exam  Constitutional:       Appearance: Normal appearance.   HENT:      Head: Normocephalic.      Nose: Nose normal.      Mouth/Throat:      Mouth: Mucous membranes are moist.      Pharynx: Oropharynx is clear. No oropharyngeal exudate or posterior oropharyngeal erythema.   Eyes:      General:         Right eye: No discharge.         Left eye: No discharge.      Conjunctiva/sclera: Conjunctivae normal.   Cardiovascular:      Rate and Rhythm: Normal rate and regular rhythm.   Pulmonary:      Effort: Pulmonary effort is normal. No respiratory distress.      Breath sounds: Normal breath sounds. No wheezing.   Musculoskeletal:         General: Normal range of motion.   Lymphadenopathy:      Cervical: No cervical adenopathy.   Skin:     General: Skin is warm and dry.   Neurological:      General: No focal deficit present.      Mental Status: He is alert and oriented to person, place, and time.   Psychiatric:         Mood and Affect: Mood normal.         Behavior: Behavior normal.         Signed Electronically by: SALINAS Alicia CNP    "

## 2025-02-15 ENCOUNTER — HEALTH MAINTENANCE LETTER (OUTPATIENT)
Age: 65
End: 2025-02-15

## 2025-07-27 ENCOUNTER — OFFICE VISIT (OUTPATIENT)
Dept: URGENT CARE | Facility: URGENT CARE | Age: 65
End: 2025-07-27
Payer: COMMERCIAL

## 2025-07-27 ENCOUNTER — ANCILLARY PROCEDURE (OUTPATIENT)
Dept: GENERAL RADIOLOGY | Facility: CLINIC | Age: 65
End: 2025-07-27
Attending: FAMILY MEDICINE
Payer: COMMERCIAL

## 2025-07-27 VITALS
DIASTOLIC BLOOD PRESSURE: 74 MMHG | RESPIRATION RATE: 22 BRPM | OXYGEN SATURATION: 98 % | TEMPERATURE: 97.5 F | SYSTOLIC BLOOD PRESSURE: 111 MMHG | HEART RATE: 81 BPM

## 2025-07-27 DIAGNOSIS — K59.00 CONSTIPATION, UNSPECIFIED CONSTIPATION TYPE: ICD-10-CM

## 2025-07-27 DIAGNOSIS — S39.012A STRAIN OF LUMBAR REGION, INITIAL ENCOUNTER: Primary | ICD-10-CM

## 2025-07-27 PROCEDURE — 72100 X-RAY EXAM L-S SPINE 2/3 VWS: CPT | Mod: TC | Performed by: RADIOLOGY

## 2025-07-27 PROCEDURE — 99213 OFFICE O/P EST LOW 20 MIN: CPT | Performed by: FAMILY MEDICINE

## 2025-07-27 RX ORDER — CYCLOBENZAPRINE HCL 5 MG
5 TABLET ORAL 3 TIMES DAILY PRN
Qty: 15 TABLET | Refills: 0 | Status: SHIPPED | OUTPATIENT
Start: 2025-07-27

## 2025-07-27 NOTE — PATIENT INSTRUCTIONS
Muscle relaxer cyclobenzaprine up to 3 x daily as needed ( May cause sedation )    Use over the counter tylenol as needed    Do some slow easy stretching    Use heat as needed to loosen up muscles    Over the counter miralax as needed to help possible constipation    Follow up as needed based on symptoms     Be seen promptly if symptoms acutely worsen

## 2025-07-27 NOTE — PROGRESS NOTES
Urgent Care Clinic Visit    Chief Complaint   Patient presents with    Urgent Care    Back Pain     Friday night when pt started having lower back/tailbone pain. No known injury but he does recall he was doing some yard work Friday morning.               7/27/2025    11:41 AM   Additional Questions   Roomed by Amarilisb   Accompanied by self           Right low back and buttock    No leg pain    No history of this in past    Some yard work at home Friday two days ago    Retired     No particular movement set it off    Symptoms really started yesterday am, morning after yard work    No fever/ chills    Eating and drinking fine     Bowels and bladder okay    Tried tylenol yesterday, did not help much     Lying down is better    No ongoing health problems      Full physical not done     Mentation and affect are fine    No tremor of speech or extremity    Patient moves slowly due to the low back discomfort    He points to right low back/ buttock area    Denies any pain down legs    Sensation and strength are normal in both lower extremities.  Negative straight leg raising test bilaterally.  Able get up on heels and toes normally.  No pain on axial loading.     Range of motion of back quite limited     No particular point tenderness spine/ back/ ribs/ pelvis    Xray lumbar spine ordered, large amount of stool present right and tranverse colon.      ASSESSMENT / PLAN:  (S39.012A) Strain of lumbar region, initial encounter  (primary encounter diagnosis)  Comment: discussed in detail with patient.  Trial of prn muscle relaxer.  Use heat prn.  Work on stretching.  Follow up prn.  Be seen promptly if symptoms acutely worsen.   Plan: XR Lumbar Spine 2/3 Views, cyclobenzaprine         (FLEXERIL) 5 MG tablet             (K59.00) Constipation, unspecified constipation type  Comment: this may be contributing to symptoms.  Discussed in detail    Plan: stay well hydrated.  Prn miralax.              I reviewed the patient's medications,  allergies, medical history, family history, and social history.    Ritchie Street MD